# Patient Record
Sex: MALE | Race: WHITE | NOT HISPANIC OR LATINO | Employment: FULL TIME | URBAN - NONMETROPOLITAN AREA
[De-identification: names, ages, dates, MRNs, and addresses within clinical notes are randomized per-mention and may not be internally consistent; named-entity substitution may affect disease eponyms.]

---

## 2019-04-04 ENCOUNTER — OFFICE VISIT (OUTPATIENT)
Dept: ORTHOPEDIC SURGERY | Facility: CLINIC | Age: 29
End: 2019-04-04

## 2019-04-04 VITALS — HEIGHT: 70 IN | BODY MASS INDEX: 41.43 KG/M2 | TEMPERATURE: 98.2 F | WEIGHT: 289.4 LBS

## 2019-04-04 DIAGNOSIS — M25.562 PAIN IN BOTH KNEES, UNSPECIFIED CHRONICITY: Primary | ICD-10-CM

## 2019-04-04 DIAGNOSIS — M25.561 PAIN IN BOTH KNEES, UNSPECIFIED CHRONICITY: Primary | ICD-10-CM

## 2019-04-04 PROCEDURE — 20610 DRAIN/INJ JOINT/BURSA W/O US: CPT | Performed by: PHYSICIAN ASSISTANT

## 2019-04-04 PROCEDURE — 73562 X-RAY EXAM OF KNEE 3: CPT | Performed by: PHYSICIAN ASSISTANT

## 2019-04-04 PROCEDURE — 99203 OFFICE O/P NEW LOW 30 MIN: CPT | Performed by: PHYSICIAN ASSISTANT

## 2019-04-04 RX ADMIN — METHYLPREDNISOLONE ACETATE 160 MG: 80 INJECTION, SUSPENSION INTRA-ARTICULAR; INTRALESIONAL; INTRAMUSCULAR; SOFT TISSUE at 09:30

## 2019-04-04 RX ADMIN — LIDOCAINE HYDROCHLORIDE 2 ML: 10 INJECTION, SOLUTION EPIDURAL; INFILTRATION; INTRACAUDAL; PERINEURAL at 09:30

## 2019-04-04 NOTE — PROGRESS NOTES
NEW VISIT    Patient: Hank Mayorga  ?  YOB: 1990  ?  Chief Complaint   Patient presents with   • Right Knee - Pain   • Left Knee - Pain      ?  HPI: Patient presents today for bilateral knee pain and discomfort that has been on going for about 2 months.  He reports the pain began in the right knee first approximately 2 months ago.  He denies any specific injury and states that he woke up one day and just had difficulty walking.  After he began walking differently to compensate for his right knee pain the left knee began hurting as well.  The pain is located at the top and behind the patella.  Today he reports the right knee is actually not hurting at this time and the left knee is rated at 9/10.  He has taken 2 rounds of oral steroids which help for short time.  He also uses ibuprofen 800 mg twice daily and although it does not completely resolve the pain it does dull it.  He states his pain is intermittent and stabbing.  There is associated clicking/popping and his pain is made worse with standing.  He has experienced decrease in symptoms with rest and NSAIDs.  He has previously seen his PCP, Dr. Quach and had x-rays but did not bring them to the visit today.      This patient is a new patient.  This problem is new to this examiner.    Allergies: No Known Allergies    Medications:   Home Medications:  Current Outpatient Medications on File Prior to Visit   Medication Sig   • [DISCONTINUED] IBUPROFEN PO Take  by mouth.     No current facility-administered medications on file prior to visit.      Current Medications:  Scheduled Meds:  PRN Meds:.    I have reviewed the patient's medical history in detail and updated the computerized patient record.  Review and summarization of old records include:    History reviewed. No pertinent past medical history.  History reviewed. No pertinent surgical history.  Social History     Occupational History   • Not on file   Tobacco Use   • Smoking status: Never  "Smoker   • Smokeless tobacco: Never Used   Substance and Sexual Activity   • Alcohol use: No     Frequency: Never   • Drug use: Defer   • Sexual activity: Not on file      Social History     Social History Narrative   • Not on file     Family History   Problem Relation Age of Onset   • Cancer Other         SKIN   • Diabetes Other    • Heart disease Other    • Hypertension Other          Review of Systems   Constitutional: Negative.  Negative for fever.   Eyes: Negative.    Respiratory: Negative.    Cardiovascular: Negative.    Endocrine: Negative.    Musculoskeletal: Positive for arthralgias, gait problem and joint swelling.   Skin: Negative.  Negative for rash and wound.   Allergic/Immunologic: Negative.    Neurological: Negative for numbness.   Hematological: Negative.    Psychiatric/Behavioral: Negative.           Wt Readings from Last 3 Encounters:   04/04/19 131 kg (289 lb 6.4 oz)     Ht Readings from Last 3 Encounters:   04/04/19 177.8 cm (70\")     Body mass index is 41.52 kg/m².  Facility age limit for growth percentiles is 20 years.  Vitals:    04/04/19 0819   Temp: 98.2 °F (36.8 °C)         Physical Exam   Constitutional: Patient is oriented to person, place, and time. Appears well-developed and well-nourished.   HENT:   Head: Normocephalic and atraumatic.   Eyes: Conjunctivae and EOM are normal. Pupils are equal, round, and reactive to light.   Cardiovascular: Normal rate, regular rhythm, normal heart sounds and intact distal pulses.   Pulmonary/Chest: Effort normal and breath sounds normal.   Musculoskeletal:   See detailed exam below   Neurological: Alert and oriented to person, place, and time. No sensory deficit. Coordination normal.   Skin: Skin is warm and dry. Capillary refill takes less than 2 seconds. No rash noted. No erythema.   Psychiatric: Patient has a normal mood and affect. Her behavior is normal. Judgment and thought content normal.   Nursing note and vitals reviewed.    Ortho Exam: "   Bilateral knee (cmp). The patients' patellar grind test is exquisitely postive.  A lot of pain and discomfort in the retropatellar area. The patient has high Q-angle. Range of motion is from 0- 120 degrees of flexion. Anterior and posterior drawer signs are negative. No medial or lateral instability is noted. The patella tends to track laterally in high grades of flexion. A Slightly positive apprehension sign is noted. There is some tenderness over the medial patellofemoral ligaments. Skin and soft tissues are swollen; consistent with inflammatory changes of the patellofemoral joint. Dorsalis pedis and posterior tibial artery pulses are palpable. Common peroneal nerve function is well preserved. Quad mechanism is well preserved.        Diagnostics:  X-Ray Report:  Bilateral knee(s) X-Ray  Indication: Evaluation of bilateral knee pain  AP, Lateral views  Findings: Very mild degenerative changes, more pronounced on left knee at patellofemoral joint space  Bony lesion: no  Soft tissues: within normal limits  Joint spaces: decreased  Hardware appropriately positioned: not applicable    Prior studies available for comparison: no     X-RAY was ordered and reviewed by Carlos Liu PA-C       Assessment:  Hank was seen today for pain and pain.    Diagnoses and all orders for this visit:    Pain in both knees, unspecified chronicity  -     XR Knee 3 View Bilateral  -     Large Joint Arthrocentesis: L knee  -     meloxicam (MOBIC) 15 MG tablet; 1 PO Daily with food.          Large Joint Arthrocentesis: L knee  Date/Time: 4/4/2019 9:30 AM  Consent given by: patient  Site marked: site marked  Timeout: Immediately prior to procedure a time out was called to verify the correct patient, procedure, equipment, support staff and site/side marked as required   Supporting Documentation  Indications: pain   Procedure Details  Location: knee - L knee  Preparation: Patient was prepped and draped in the usual sterile  fashion  Needle size: 25 G  Approach: anteromedial  Medications administered: 2 mL lidocaine PF 1% 1 %; 160 mg methylPREDNISolone acetate 80 MG/ML  Patient tolerance: patient tolerated the procedure well with no immediate complications        ?    Plan    · Discussed treatment options including NSAIDs, intra-articular injections of steroid and Visco supplementation, bracing and physical therapy  · Risks and benefits of injection therapy discussed with patient.  Possibility of bruising, pain, swelling, infection, increased blood sugar levels and soft tissue atrophy discussed in detail.  Plan is to proceed with injection of steroid in the left knee.  · Injected patient's left knee joint(s)with steroid  from an anteromedial approach   · Hinged knee brace provided in office today  · Rest, ice, compression, and elevation (RICE) therapy  · Stretching and strengthening exercises  · New prescription: Mobic 15 mg/# 30/1 tablet by mouth daily.  Instructed not to take any other NSAIDs while on Mobic.  · Follow up in 3 month(s)  · Time spent with patient: 35 minutes face-to-face with greater than 50% spent in counseling and coordination of care as discussed above.      Carlos Liu PA-C  4/4/19

## 2019-04-08 RX ORDER — MELOXICAM 15 MG/1
TABLET ORAL
Qty: 30 TABLET | Refills: 2 | Status: SHIPPED | OUTPATIENT
Start: 2019-04-08

## 2019-04-08 RX ORDER — METHYLPREDNISOLONE ACETATE 80 MG/ML
160 INJECTION, SUSPENSION INTRA-ARTICULAR; INTRALESIONAL; INTRAMUSCULAR; SOFT TISSUE
Status: COMPLETED | OUTPATIENT
Start: 2019-04-04 | End: 2019-04-04

## 2019-04-08 RX ORDER — LIDOCAINE HYDROCHLORIDE 10 MG/ML
2 INJECTION, SOLUTION EPIDURAL; INFILTRATION; INTRACAUDAL; PERINEURAL
Status: COMPLETED | OUTPATIENT
Start: 2019-04-04 | End: 2019-04-04

## 2019-06-14 ENCOUNTER — OFFICE VISIT (OUTPATIENT)
Dept: ORTHOPEDIC SURGERY | Facility: CLINIC | Age: 29
End: 2019-06-14

## 2019-06-14 VITALS — HEIGHT: 70 IN | BODY MASS INDEX: 41.37 KG/M2 | WEIGHT: 289 LBS

## 2019-06-14 DIAGNOSIS — M25.562 CHRONIC PAIN OF BOTH KNEES: Primary | ICD-10-CM

## 2019-06-14 DIAGNOSIS — G89.29 CHRONIC PAIN OF BOTH KNEES: Primary | ICD-10-CM

## 2019-06-14 DIAGNOSIS — M25.561 CHRONIC PAIN OF BOTH KNEES: Primary | ICD-10-CM

## 2019-06-14 PROCEDURE — 20610 DRAIN/INJ JOINT/BURSA W/O US: CPT | Performed by: PHYSICIAN ASSISTANT

## 2019-06-14 RX ADMIN — METHYLPREDNISOLONE ACETATE 160 MG: 80 INJECTION, SUSPENSION INTRA-ARTICULAR; INTRALESIONAL; INTRAMUSCULAR; SOFT TISSUE at 08:55

## 2019-06-14 RX ADMIN — LIDOCAINE HYDROCHLORIDE 2 ML: 10 INJECTION, SOLUTION EPIDURAL; INFILTRATION; INTRACAUDAL; PERINEURAL at 08:55

## 2019-06-14 NOTE — PROGRESS NOTES
FOLLOW UP VISIT    Patient: Hank Mayorga  ?  YOB: 1990    MRN: [id number]  ?  Chief Complaint   Patient presents with   • Left Knee - Follow-up   • Right Knee - Follow-up      ?  HPI:   28 year old male patient presents today for a follow up on bilateral knee pain. At last visit, 2 months ago, he received a steroid injection into the left knee. He states it is still doing fairly well with minimal discomfort. He returns today for increased pain in the right knee. The pain is described as a stabbing pain that is intermittent. Today he would like to try a steroid injection into the right knee.     This patient is an established patient.  This problem is not new to this examiner.      Allergies: No Known Allergies    Medications:   Home Medications:  Current Outpatient Medications on File Prior to Visit   Medication Sig   • diclofenac (VOLTAREN) 50 MG EC tablet TAKE 1 TABLET BY MOUTH TWICE DAILY - TAKE WITH FOOD   • meloxicam (MOBIC) 15 MG tablet 1 PO Daily with food.     No current facility-administered medications on file prior to visit.      Current Medications:  Scheduled Meds:  PRN Meds:.    I have reviewed the patient's medical history in detail and updated the computerized patient record.  Review and summarization of old records include:    History reviewed. No pertinent past medical history.  No past surgical history on file.  Social History     Occupational History   • Not on file   Tobacco Use   • Smoking status: Never Smoker   • Smokeless tobacco: Never Used   Substance and Sexual Activity   • Alcohol use: No     Frequency: Never   • Drug use: Defer   • Sexual activity: Not on file      Social History     Social History Narrative   • Not on file     Family History   Problem Relation Age of Onset   • Cancer Other         SKIN   • Diabetes Other    • Heart disease Other    • Hypertension Other          Review of Systems       Wt Readings from Last 3 Encounters:   06/14/19 131 kg (289 lb)  "  04/04/19 131 kg (289 lb 6.4 oz)     Ht Readings from Last 3 Encounters:   06/14/19 177.8 cm (70\")   04/04/19 177.8 cm (70\")     Body mass index is 41.47 kg/m².  Facility age limit for growth percentiles is 20 years.  There were no vitals filed for this visit.      Physical Exam   Constitutional: Patient is oriented to person, place, and time. Appears well-developed and well-nourished.   HENT:   Head: Normocephalic and atraumatic.   Eyes: Conjunctivae and EOM are normal. Pupils are equal, round, and reactive to light.   Cardiovascular: Normal rate, regular rhythm, normal heart sounds and intact distal pulses.   Pulmonary/Chest: Effort normal and breath sounds normal.   Musculoskeletal:   See detailed exam below   Neurological: Alert and oriented to person, place, and time. No sensory deficit. Coordination normal.   Skin: Skin is warm and dry. Capillary refill takes less than 2 seconds. No rash noted. No erythema.   Psychiatric: Patient has a normal mood and affect. Her behavior is normal. Judgment and thought content normal.   Nursing note and vitals reviewed.    Ortho Exam:   Bilateral knee (cmp). The patients' patellar grind test is exquisitely postive.  A lot of pain and discomfort in the retropatellar area. The patient has high Q-angle. Range of motion is from 0-120 degrees of flexion. Anterior and posterior drawer signs are negative. No medial or lateral instability is noted. The patella tends to track laterally in high grades of flexion. A Slightly positive apprehension sign is noted. There is some tenderness over the medial patellofemoral ligaments. Skin and soft tissues are swollen; consistent with inflammatory changes of the patellofemoral joint. Dorsalis pedis and posterior tibial artery pulses are palpable. Common peroneal nerve function is well preserved. Quad mechanism is well preserved.          Diagnostics:       Assessment:  Hank was seen today for follow-up and follow-up.    Diagnoses and all " orders for this visit:    Chronic pain of both knees          Large Joint Arthrocentesis: R knee  Date/Time: 6/14/2019 8:55 AM  Consent given by: patient  Site marked: site marked  Timeout: Immediately prior to procedure a time out was called to verify the correct patient, procedure, equipment, support staff and site/side marked as required   Supporting Documentation  Indications: pain   Procedure Details  Location: knee - R knee  Preparation: Patient was prepped and draped in the usual sterile fashion  Needle size: 25 G  Approach: anteromedial  Medications administered: 2 mL lidocaine PF 1% 1 %; 160 mg methylPREDNISolone acetate 80 MG/ML  Patient tolerance: patient tolerated the procedure well with no immediate complications        ?    Plan    · Injected patient's right knee joint(s)with steroid  from a(n) anteromedial approach.  Patient tolerated the procedure well and no complications were encountered.  · Continue to use hinged knee brace as needed  · Rest, ice, compression, and elevation (RICE) therapy  · Stretching and strengthening exercises  · Continue with mobic or other anti-inflammatory   · Follow up in 3-6 month(s)    Carlos Liu PA-C  06/14/2019

## 2019-06-16 RX ORDER — METHYLPREDNISOLONE ACETATE 80 MG/ML
160 INJECTION, SUSPENSION INTRA-ARTICULAR; INTRALESIONAL; INTRAMUSCULAR; SOFT TISSUE
Status: COMPLETED | OUTPATIENT
Start: 2019-06-14 | End: 2019-06-14

## 2019-06-16 RX ORDER — LIDOCAINE HYDROCHLORIDE 10 MG/ML
2 INJECTION, SOLUTION EPIDURAL; INFILTRATION; INTRACAUDAL; PERINEURAL
Status: COMPLETED | OUTPATIENT
Start: 2019-06-14 | End: 2019-06-14

## 2020-09-16 ENCOUNTER — CLINICAL SUPPORT (OUTPATIENT)
Dept: ORTHOPEDIC SURGERY | Facility: CLINIC | Age: 30
End: 2020-09-16

## 2020-09-16 VITALS — BODY MASS INDEX: 40.09 KG/M2 | WEIGHT: 280 LBS | TEMPERATURE: 98.7 F | HEIGHT: 70 IN

## 2020-09-16 DIAGNOSIS — M25.562 CHRONIC PAIN OF BOTH KNEES: Primary | ICD-10-CM

## 2020-09-16 DIAGNOSIS — M25.561 CHRONIC PAIN OF BOTH KNEES: Primary | ICD-10-CM

## 2020-09-16 DIAGNOSIS — G89.29 CHRONIC PAIN OF BOTH KNEES: Primary | ICD-10-CM

## 2020-09-16 PROCEDURE — 20610 DRAIN/INJ JOINT/BURSA W/O US: CPT | Performed by: PHYSICIAN ASSISTANT

## 2020-09-16 RX ORDER — METHYLPREDNISOLONE ACETATE 80 MG/ML
160 INJECTION, SUSPENSION INTRA-ARTICULAR; INTRALESIONAL; INTRAMUSCULAR; SOFT TISSUE
Status: COMPLETED | OUTPATIENT
Start: 2020-09-16 | End: 2020-09-16

## 2020-09-16 RX ORDER — LIDOCAINE HYDROCHLORIDE 10 MG/ML
2 INJECTION, SOLUTION INFILTRATION; PERINEURAL
Status: COMPLETED | OUTPATIENT
Start: 2020-09-16 | End: 2020-09-16

## 2020-09-16 RX ADMIN — METHYLPREDNISOLONE ACETATE 160 MG: 80 INJECTION, SUSPENSION INTRA-ARTICULAR; INTRALESIONAL; INTRAMUSCULAR; SOFT TISSUE at 08:39

## 2020-09-16 RX ADMIN — LIDOCAINE HYDROCHLORIDE 2 ML: 10 INJECTION, SOLUTION INFILTRATION; PERINEURAL at 08:39

## 2020-09-16 NOTE — PROGRESS NOTES
Procedure   Large Joint Arthrocentesis: R knee  Date/Time: 9/16/2020 8:39 AM  Consent given by: patient  Site marked: site marked  Timeout: Immediately prior to procedure a time out was called to verify the correct patient, procedure, equipment, support staff and site/side marked as required   Supporting Documentation  Indications: pain   Procedure Details  Location: knee - R knee  Preparation: Patient was prepped and draped in the usual sterile fashion  Needle size: 25 G  Approach: anteromedial  Medications administered: 2 mL lidocaine 1 %; 160 mg methylPREDNISolone acetate 80 MG/ML  Patient tolerance: patient tolerated the procedure well with no immediate complications    Large Joint Arthrocentesis: L knee  Date/Time: 9/16/2020 8:39 AM  Consent given by: patient  Site marked: site marked  Timeout: Immediately prior to procedure a time out was called to verify the correct patient, procedure, equipment, support staff and site/side marked as required   Supporting Documentation  Indications: pain   Procedure Details  Location: knee - L knee  Preparation: Patient was prepped and draped in the usual sterile fashion  Needle size: 25 G  Approach: anteromedial  Medications administered: 2 mL lidocaine 1 %; 160 mg methylPREDNISolone acetate 80 MG/ML  Patient tolerance: patient tolerated the procedure well with no immediate complications      Electronically signed by Carlos Liu PA-C, 09/16/20, 9:13 AM EDT.

## 2020-09-16 NOTE — PROGRESS NOTES
INJECTION      Patient: Hank Mayorga    MRN: [id number]    YOB: 1990    Chief Complaint   Patient presents with   • Left Knee - Follow-up, Pain   • Right Knee - Follow-up, Pain         History of Present Illness:  Hank Mayorga is here today for injection therapy. He receives  BILATERAL knee injections of steroid. Since last injection, patient notes substantial relief of symptoms.  It has been greater than 1 year since last set of injections but he has been trying to hold off to limit the exposure of steroids due to his young age.  Treatment options have been discussed and he understands and consents.       Physical Exam:   29 y.o. male awake, alert, oriented, in no acute distress and well developed, well nourished.  BILATERAL knee  · Positive patellar grind test with pain in the retropatellar region.    · Positive for high Q-angle with patella tracking laterally in high grades of flexion.   · Skin and soft tissues are swollen, consistent with inflammatory changes of the patellofemoral joint.    · Positive for tenderness over the medial patellofemoral ligaments.   · Quadriceps mechanism is well preserved.   · Range of motion-Extension to Flexion: 0-120 degrees.  · Negative apprehension sign.  · Negative anterior and posterior drawer. No medial or lateral instability noted.   · Dorsalis pedis and posterior tibial artery pulses are palpable. Common peroneal nerve function is well preserved.        Procedures  See separate procedure note  Injection site was identified by physical examination and cleaned with Betadine and alcohol swabs. Prior to needle insertion, ethyl chloride spray was used for surface anesthesia. Sterile technique was used.       ASSESSMENT:  Hank was seen today for follow-up, pain, follow-up and pain.    Diagnoses and all orders for this visit:    Chronic pain of both knees  -     Large Joint Arthrocentesis: R knee  -     Large Joint Arthrocentesis: L knee          PLAN:    • Bilateral knee steroid injection was discussed with the patient. Discussed indication, risks, benefits, and alternatives. Verbal consent was given to proceed with the procedure.   • Injection was performed from anteromedial approach.  Patient tolerated the procedure well and no complications were encountered.  • Discussion of orthopedic goals and activities and patient/guardian expressed understanding.  • Ice, heat, rest, compression and elevation of extremity as beneficial  • nsaids and/or tylenol as beneficial  • Instructed to refrain from heavy activity/rest the extremity for the next 24-48 hours  • Discussion regarding possibility of cortisol flare and what to expect if this occurs  • Watch for signs and symptoms of infection  • Call if adverse effect from injection therapy  • Follow up PRN      Carlos Liu PA-C  Encounter Date: 9/16/2020    Electronically signed by Carlos Liu PA-C, 09/16/20, 9:13 AM EDT.      EMR Dragon/Transcription disclaimer:  Much of this encounter note is an electronic transcription/translation of spoken language to printed text. The electronic translation of spoken language may permit erroneous, or at times, nonsensical words or phrases to be inadvertently transcribed; Although I have reviewed the note for such errors, some may still exist.

## 2023-01-03 ENCOUNTER — HOSPITAL ENCOUNTER (OUTPATIENT)
Dept: GENERAL RADIOLOGY | Facility: HOSPITAL | Age: 33
Discharge: HOME | End: 2023-01-03
Admitting: PHYSICIAN ASSISTANT
Payer: COMMERCIAL

## 2023-01-03 ENCOUNTER — OFFICE VISIT (OUTPATIENT)
Dept: ORTHOPEDIC SURGERY | Facility: CLINIC | Age: 33
End: 2023-01-03
Payer: COMMERCIAL

## 2023-01-03 VITALS — HEIGHT: 70 IN | BODY MASS INDEX: 40.09 KG/M2 | WEIGHT: 280 LBS

## 2023-01-03 DIAGNOSIS — M25.561 CHRONIC PAIN OF BOTH KNEES: ICD-10-CM

## 2023-01-03 DIAGNOSIS — G89.29 CHRONIC PAIN OF BOTH KNEES: Primary | ICD-10-CM

## 2023-01-03 DIAGNOSIS — G89.29 CHRONIC PAIN OF BOTH KNEES: ICD-10-CM

## 2023-01-03 DIAGNOSIS — M25.562 CHRONIC PAIN OF BOTH KNEES: ICD-10-CM

## 2023-01-03 DIAGNOSIS — M17.10 PATELLOFEMORAL ARTHRITIS: ICD-10-CM

## 2023-01-03 DIAGNOSIS — M25.562 CHRONIC PAIN OF BOTH KNEES: Primary | ICD-10-CM

## 2023-01-03 DIAGNOSIS — M25.561 CHRONIC PAIN OF BOTH KNEES: Primary | ICD-10-CM

## 2023-01-03 PROCEDURE — 73562 X-RAY EXAM OF KNEE 3: CPT

## 2023-01-03 PROCEDURE — 20610 DRAIN/INJ JOINT/BURSA W/O US: CPT | Performed by: PHYSICIAN ASSISTANT

## 2023-01-03 PROCEDURE — 99214 OFFICE O/P EST MOD 30 MIN: CPT | Performed by: PHYSICIAN ASSISTANT

## 2023-01-03 RX ORDER — METHYLPREDNISOLONE ACETATE 80 MG/ML
160 INJECTION, SUSPENSION INTRA-ARTICULAR; INTRALESIONAL; INTRAMUSCULAR; SOFT TISSUE
Status: COMPLETED | OUTPATIENT
Start: 2023-01-03 | End: 2023-01-03

## 2023-01-03 RX ORDER — LIDOCAINE HYDROCHLORIDE 20 MG/ML
2 INJECTION, SOLUTION EPIDURAL; INFILTRATION; INTRACAUDAL; PERINEURAL
Status: COMPLETED | OUTPATIENT
Start: 2023-01-03 | End: 2023-01-03

## 2023-01-03 RX ADMIN — METHYLPREDNISOLONE ACETATE 160 MG: 80 INJECTION, SUSPENSION INTRA-ARTICULAR; INTRALESIONAL; INTRAMUSCULAR; SOFT TISSUE at 11:52

## 2023-01-03 RX ADMIN — LIDOCAINE HYDROCHLORIDE 2 ML: 20 INJECTION, SOLUTION EPIDURAL; INFILTRATION; INTRACAUDAL; PERINEURAL at 11:52

## 2023-01-03 NOTE — PROGRESS NOTES
Chief Complaint  Follow-up of the Left Knee and Follow-up of the Right Knee    Subjective    History of Present Illness      Hank Mayorga is a 32 y.o. male who presents to [facility] for follow up on  Knee Pain:   Patient complains of bilateral knee pain.  I last saw him for this complaint in September 2020 and administered bilateral knee injections.  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  Previously most of his pain, as well as now, is in the patellar region.  He states last set of injections lasted a good 6+ months.  He is experiencing pain with daily activities such as climbing, kneeling, walking.             Objective   Vital Signs:   Ht 177.8 cm (70\")   Wt 127 kg (280 lb)   BMI 40.18 kg/m²     Physical Exam  Vitals and nursing note reviewed.   Constitutional:       Appearance: Normal appearance.   Pulmonary:      Effort: Pulmonary effort is normal.   Skin:     General: Skin is warm and dry.      Capillary Refill: Capillary refill takes less than 2 seconds.   Neurological:      General: No focal deficit present.      Mental Status: He is alert and oriented to person, place, and time. Mental status is at baseline.   Psychiatric:         Mood and Affect: Mood normal.         Behavior: Behavior normal.         Thought Content: Thought content normal.         Judgment: Judgment normal.           Ortho Exam   BILATERAL knee  Positive patellar grind test with pain in the retropatellar region.    Skin and soft tissues consistent with inflammatory changes of the patellofemoral joint.    Positive for tenderness over the medial patellofemoral ligaments.   Quadriceps mechanism is well preserved.   Range of motion-Extension to Flexion: 0-120 degrees.  Negative anterior and posterior drawer. No medial or lateral instability noted.   Dorsalis pedis and posterior tibial artery  pulses are palpable.   Common peroneal nerve function is well preserved.      Result Review :   Radiologic studies - see below for interpretation  BILATERAL knee xrays  weightbearing/standing 3 views each were ordered by Carlos Liu PA-C. Performed at [facility] on 1/3/2023. Images were independently viewed and interpreted by myself, my impression as follows:  Findings: Mild joint space narrowing of the medial compartment bilaterally, mild degenerative changes of the patellofemoral compartment.  Lateral compartment fairly preserved.  Bony lesion: no  Soft tissues: within normal limits  Joint spaces: See above  Hardware appropriately positioned: not applicable  Prior studies available for comparison: yes           PROCEDURE  Large Joint Arthrocentesis: R knee  Date/Time: 1/3/2023 11:52 AM  Consent given by: patient  Site marked: site marked  Timeout: Immediately prior to procedure a time out was called to verify the correct patient, procedure, equipment, support staff and site/side marked as required   Supporting Documentation  Indications: pain   Procedure Details  Location: knee - R knee  Preparation: Patient was prepped and draped in the usual sterile fashion  Needle size: 25 G  Approach: anteromedial  Medications administered: 160 mg methylPREDNISolone acetate 80 MG/ML; 2 mL lidocaine PF 2% 2 %  Patient tolerance: patient tolerated the procedure well with no immediate complications    Large Joint Arthrocentesis: L knee  Date/Time: 1/3/2023 11:52 AM  Consent given by: patient  Site marked: site marked  Timeout: Immediately prior to procedure a time out was called to verify the correct patient, procedure, equipment, support staff and site/side marked as required   Supporting Documentation  Indications: pain   Procedure Details  Location: knee - L knee  Preparation: Patient was prepped and draped in the usual sterile fashion  Needle size: 25 G  Approach: anteromedial  Medications  administered: 160 mg methylPREDNISolone acetate 80 MG/ML; 2 mL lidocaine PF 2% 2 %  Patient tolerance: patient tolerated the procedure well with no immediate complications                 Assessment   Assessment and Plan    Diagnoses and all orders for this visit:    1. Chronic pain of both knees (Primary)  -     Large Joint Arthrocentesis: R knee  -     Large Joint Arthrocentesis: L knee    2. Patellofemoral arthritis  -     Large Joint Arthrocentesis: R knee  -     Large Joint Arthrocentesis: L knee         Follow Up   · Discussion of any imaging in detail. Discussion of orthopaedic goals.  · Risk, benefits, and merits of treatment alternatives reviewed with the patient. Treatment alternatives include: oral anti-inflammatories/NSAID, intra-articular steroid injection, intra-articular visco supplementation and further imaging/testing  · Ice, heat, and/or modalities as beneficial  · Risks of minor surgical procedure/intra-articular injection, including but not limited to pain, bleeding, infection into the joint, pigment skin changes related to steroid administration, increased blood sugar levels secondary to steroid administration, scar, recurrence of pain, and tendon rupture associated with steroid administration and possible need for further surgery reviewed with patient.  He accepts these risks and wishes to proceed with procedure. Injected patient's bilateral knee joint(s) with Depo-Medrol from a(n) anteromedial approach.  Patient tolerated the procedure well and no complications were encountered.  · Watch for signs and symptoms of infection  · Call or notify for any adverse effect from injection therapy  · Patient is encouraged to call or return for any issues or concerns.  · No brace was given at today's visit.  · Follow up as needed  • Patient was given instructions and counseling regarding his condition or for health maintenance advice. Please see specific information pulled into the AVS if appropriate.      Carlos Liu PA-C   Date of Encounter: 1/3/2023   Electronically signed by Carlos Liu PA-C, 01/03/23, 8:23 AM EST.   Electronically signed by Carlos Liu PA-C, 01/03/23, 12:11 PM EST.

## 2023-08-22 ENCOUNTER — OFFICE VISIT (OUTPATIENT)
Dept: FAMILY MEDICINE CLINIC | Facility: CLINIC | Age: 33
End: 2023-08-22
Payer: COMMERCIAL

## 2023-08-22 VITALS
HEIGHT: 70 IN | SYSTOLIC BLOOD PRESSURE: 118 MMHG | OXYGEN SATURATION: 96 % | WEIGHT: 289 LBS | BODY MASS INDEX: 41.37 KG/M2 | HEART RATE: 80 BPM | TEMPERATURE: 97.3 F | DIASTOLIC BLOOD PRESSURE: 72 MMHG

## 2023-08-22 DIAGNOSIS — R42 DIZZINESS: Primary | ICD-10-CM

## 2023-08-22 DIAGNOSIS — Z11.59 NEED FOR HEPATITIS C SCREENING TEST: ICD-10-CM

## 2023-08-22 DIAGNOSIS — Z13.89 SCREENING FOR HEMATURIA OR PROTEINURIA: ICD-10-CM

## 2023-08-22 DIAGNOSIS — R06.83 SNORING: ICD-10-CM

## 2023-08-22 DIAGNOSIS — Z13.220 SCREENING FOR HYPERLIPIDEMIA: ICD-10-CM

## 2023-08-22 DIAGNOSIS — R53.83 OTHER FATIGUE: ICD-10-CM

## 2023-08-22 DIAGNOSIS — Z13.0 SCREENING FOR DEFICIENCY ANEMIA: ICD-10-CM

## 2023-08-22 PROCEDURE — 99204 OFFICE O/P NEW MOD 45 MIN: CPT | Performed by: NURSE PRACTITIONER

## 2023-08-22 NOTE — PROGRESS NOTES
"Chief Complaint  Establish Care and Annual Exam    Subjective        Hank Mayorga presents to [facility]  History of Present Illness  This is a 32-year-old male patient here today to establish care.  He does not report any major health problems other than being in the shower the other day and feeling like he was going to pass out.  He states he did catch himself and did rested.  He stated fine after he rested and stayed home that day.  He does have a family history of coronary artery disease where his dad has had multiple heart attacks starting at the age of 40.  He does have arthritis in his knees and has had cortisone injections in both knees in the past.  He has had a colonoscopy 13 years ago where he was diagnosed with H. pylori.  He does report report fatigue and snoring at night.  His blood pressure is stable.  He denies any chest pain or shortness of breath lightheaded or dizziness today.        Objective   Vital Signs:  /72   Pulse 80   Temp 97.3 øF (36.3 øC)   Ht 177.8 cm (70\")   Wt 131 kg (289 lb)   SpO2 96%   BMI 41.47 kg/mý   Estimated body mass index is 41.47 kg/mý as calculated from the following:    Height as of this encounter: 177.8 cm (70\").    Weight as of this encounter: 131 kg (289 lb).       Class 3 Severe Obesity (BMI >=40). Obesity-related health conditions include the following: osteoarthritis. Obesity is newly identified. BMI is is above average; BMI management plan is completed. We discussed portion control and increasing exercise.      Physical Exam  Vitals and nursing note reviewed.   HENT:      Head: Normocephalic.      Nose: Nose normal.   Eyes:      Pupils: Pupils are equal, round, and reactive to light.   Cardiovascular:      Rate and Rhythm: Normal rate and regular rhythm.      Pulses: Normal pulses.      Heart sounds: Normal heart sounds.   Pulmonary:      Effort: Pulmonary effort is normal. No respiratory distress.      Breath sounds: " Normal breath sounds. No wheezing or rales.   Abdominal:      General: Bowel sounds are normal. There is no distension.      Tenderness: There is no abdominal tenderness.   Musculoskeletal:         General: No swelling.      Cervical back: Neck supple.      Right lower leg: No edema.      Left lower leg: No edema.   Skin:     General: Skin is warm and dry.   Neurological:      Mental Status: He is alert and oriented to person, place, and time.   Psychiatric:         Mood and Affect: Mood normal.      Result Review :                   Assessment and Plan   Diagnoses and all orders for this visit:    1. Dizziness (Primary)  -     TSH  -     T4, Free  -     T3    2. Screening for deficiency anemia  -     CBC & Differential    3. Screening for hyperlipidemia  -     Comprehensive Metabolic Panel  -     Lipid Panel    4. Snoring  -     Ambulatory Referral to Sleep Medicine    5. Other fatigue  -     TSH  -     T4, Free  -     T3  -     Ambulatory Referral to Sleep Medicine    6. Screening for hematuria or proteinuria  -     Urinalysis With Culture If Indicated - Urine, Clean Catch    7. Need for hepatitis C screening test  -     Hepatitis C Antibody    Patient has not had any more episodes of dizziness.  I will obtain lab work today.  We did discuss sleep medicine and I will put in a referral for that today.  He will monitor for any further symptoms and let me know for further work-up       Follow Up   No follow-ups on file.  Patient was given instructions and counseling regarding his condition or for health maintenance advice. Please see specific information pulled into the AVS if appropriate.

## 2023-08-26 LAB
ALBUMIN SERPL-MCNC: 4.4 G/DL (ref 4.1–5.1)
ALBUMIN/GLOB SERPL: 1.5 {RATIO} (ref 1.2–2.2)
ALP SERPL-CCNC: 77 IU/L (ref 44–121)
ALT SERPL-CCNC: 43 IU/L (ref 0–44)
APPEARANCE UR: CLEAR
AST SERPL-CCNC: 35 IU/L (ref 0–40)
BACTERIA #/AREA URNS HPF: NORMAL /[HPF]
BASOPHILS # BLD AUTO: 0 X10E3/UL (ref 0–0.2)
BASOPHILS NFR BLD AUTO: 0 %
BILIRUB SERPL-MCNC: 0.6 MG/DL (ref 0–1.2)
BILIRUB UR QL STRIP: NEGATIVE
BUN SERPL-MCNC: 9 MG/DL (ref 6–20)
BUN/CREAT SERPL: 10 (ref 9–20)
CALCIUM SERPL-MCNC: 9.8 MG/DL (ref 8.7–10.2)
CASTS URNS QL MICRO: NORMAL /LPF
CHLORIDE SERPL-SCNC: 103 MMOL/L (ref 96–106)
CHOLEST SERPL-MCNC: 184 MG/DL (ref 100–199)
CO2 SERPL-SCNC: 21 MMOL/L (ref 20–29)
COLOR UR: YELLOW
CREAT SERPL-MCNC: 0.93 MG/DL (ref 0.76–1.27)
EGFRCR SERPLBLD CKD-EPI 2021: 111 ML/MIN/1.73
EOSINOPHIL # BLD AUTO: 0.1 X10E3/UL (ref 0–0.4)
EOSINOPHIL NFR BLD AUTO: 1 %
EPI CELLS #/AREA URNS HPF: NORMAL /HPF (ref 0–10)
ERYTHROCYTE [DISTWIDTH] IN BLOOD BY AUTOMATED COUNT: 13.7 % (ref 11.6–15.4)
GLOBULIN SER CALC-MCNC: 3 G/DL (ref 1.5–4.5)
GLUCOSE SERPL-MCNC: 83 MG/DL (ref 70–99)
GLUCOSE UR QL STRIP: NEGATIVE
HCT VFR BLD AUTO: 46.9 % (ref 37.5–51)
HCV IGG SERPL QL IA: NON REACTIVE
HDLC SERPL-MCNC: 25 MG/DL
HGB BLD-MCNC: 15.1 G/DL (ref 13–17.7)
HGB UR QL STRIP: NEGATIVE
IMM GRANULOCYTES # BLD AUTO: 0 X10E3/UL (ref 0–0.1)
IMM GRANULOCYTES NFR BLD AUTO: 0 %
KETONES UR QL STRIP: ABNORMAL
LDLC SERPL CALC-MCNC: 71 MG/DL (ref 0–99)
LEUKOCYTE ESTERASE UR QL STRIP: NEGATIVE
LYMPHOCYTES # BLD AUTO: 2.5 X10E3/UL (ref 0.7–3.1)
LYMPHOCYTES NFR BLD AUTO: 27 %
MCH RBC QN AUTO: 29.4 PG (ref 26.6–33)
MCHC RBC AUTO-ENTMCNC: 32.2 G/DL (ref 31.5–35.7)
MCV RBC AUTO: 91 FL (ref 79–97)
MICRO URNS: ABNORMAL
MICRO URNS: ABNORMAL
MONOCYTES # BLD AUTO: 0.6 X10E3/UL (ref 0.1–0.9)
MONOCYTES NFR BLD AUTO: 7 %
NEUTROPHILS # BLD AUTO: 6 X10E3/UL (ref 1.4–7)
NEUTROPHILS NFR BLD AUTO: 65 %
NITRITE UR QL STRIP: NEGATIVE
PH UR STRIP: 5.5 [PH] (ref 5–7.5)
PLATELET # BLD AUTO: 222 X10E3/UL (ref 150–450)
POTASSIUM SERPL-SCNC: 3.9 MMOL/L (ref 3.5–5.2)
PROT SERPL-MCNC: 7.4 G/DL (ref 6–8.5)
PROT UR QL STRIP: NEGATIVE
RBC # BLD AUTO: 5.14 X10E6/UL (ref 4.14–5.8)
RBC #/AREA URNS HPF: NORMAL /HPF (ref 0–2)
SODIUM SERPL-SCNC: 144 MMOL/L (ref 134–144)
SP GR UR STRIP: 1.02 (ref 1–1.03)
T3 SERPL-MCNC: 139 NG/DL (ref 71–180)
T4 FREE SERPL-MCNC: 1.15 NG/DL (ref 0.82–1.77)
TRIGL SERPL-MCNC: 566 MG/DL (ref 0–149)
TSH SERPL DL<=0.005 MIU/L-ACNC: 1.76 UIU/ML (ref 0.45–4.5)
URINALYSIS REFLEX: ABNORMAL
UROBILINOGEN UR STRIP-MCNC: 0.2 MG/DL (ref 0.2–1)
VLDLC SERPL CALC-MCNC: 88 MG/DL (ref 5–40)
WBC # BLD AUTO: 9.3 X10E3/UL (ref 3.4–10.8)
WBC #/AREA URNS HPF: NORMAL /HPF (ref 0–5)

## 2023-09-19 RX ORDER — ICOSAPENT ETHYL 1000 MG/1
2 CAPSULE ORAL 2 TIMES DAILY WITH MEALS
Qty: 120 CAPSULE | Refills: 3 | Status: SHIPPED | OUTPATIENT
Start: 2023-09-19 | End: 2023-09-19 | Stop reason: SDUPTHER

## 2023-09-19 RX ORDER — ICOSAPENT ETHYL 1000 MG/1
2 CAPSULE ORAL 2 TIMES DAILY WITH MEALS
Qty: 120 CAPSULE | Refills: 3 | Status: SHIPPED | OUTPATIENT
Start: 2023-09-19

## 2023-09-19 NOTE — PROGRESS NOTES
Triglycerides are elevated and recommend starting a medication called Vascepa.  I have sent that over to his pharmacy.  I would like for him to follow-up in 3 months for repeat lipid panel.  Also limit sugary foods, drinks and alcohol.

## 2023-09-27 RX ORDER — OMEGA-3-ACID ETHYL ESTERS 1 G/1
2 CAPSULE, LIQUID FILLED ORAL 2 TIMES DAILY
Qty: 60 CAPSULE | Refills: 5 | Status: SHIPPED | OUTPATIENT
Start: 2023-09-27 | End: 2023-09-29 | Stop reason: SDUPTHER

## 2023-09-29 ENCOUNTER — PRIOR AUTHORIZATION (OUTPATIENT)
Dept: FAMILY MEDICINE CLINIC | Facility: CLINIC | Age: 33
End: 2023-09-29
Payer: COMMERCIAL

## 2023-09-29 RX ORDER — OMEGA-3-ACID ETHYL ESTERS 1 G/1
2 CAPSULE, LIQUID FILLED ORAL 2 TIMES DAILY
Qty: 60 CAPSULE | Refills: 5 | Status: SHIPPED | OUTPATIENT
Start: 2023-09-29

## 2023-09-29 NOTE — TELEPHONE ENCOUNTER
Patients prior authorization for Omega-3-acid Ethyl Esters 1GM Capsules started on 09/29/23 and approved 09/29/23.     I called patient to advise him and he stated that he needs the medication to go to [facility] in [place] instead of [facility] in [place].

## 2023-11-03 ENCOUNTER — OFFICE VISIT (OUTPATIENT)
Dept: SLEEP MEDICINE | Facility: HOSPITAL | Age: 33
End: 2023-11-03
Payer: COMMERCIAL

## 2023-11-03 VITALS
OXYGEN SATURATION: 97 % | HEART RATE: 52 BPM | DIASTOLIC BLOOD PRESSURE: 66 MMHG | SYSTOLIC BLOOD PRESSURE: 118 MMHG | HEIGHT: 70 IN | WEIGHT: 300 LBS | BODY MASS INDEX: 42.95 KG/M2

## 2023-11-03 DIAGNOSIS — G47.10 HYPERSOMNIA: Primary | ICD-10-CM

## 2023-11-03 DIAGNOSIS — E66.01 MORBID OBESITY WITH BODY MASS INDEX OF 40.0-49.9: ICD-10-CM

## 2023-11-03 DIAGNOSIS — R06.83 SNORING: ICD-10-CM

## 2023-11-03 DIAGNOSIS — R06.81 WITNESSED EPISODE OF APNEA: ICD-10-CM

## 2023-11-03 DIAGNOSIS — R53.83 OTHER FATIGUE: ICD-10-CM

## 2023-11-03 PROCEDURE — G0463 HOSPITAL OUTPT CLINIC VISIT: HCPCS

## 2023-11-03 NOTE — PROGRESS NOTES
"[facility]  Telephone: [phone] / Fax: [phone] [place]  Telephone: [phone] / Fax: [phone] Nalini Sorenson    Referring Physician: Marycarmen Rodriguez APRN  PCP: Marycarmen Rodriguez APRN    Reason for consult:  sleep apnea    Hank Mayorga is a 33 y.o.male  was seen in the Sleep Disorders Center today for evaluation of sleep apnea. He reports loud snoring observed by the wife for several years now. In the past it occurred in supine position only, now it occurs in all positions and is loud. He wakes up choking/gasping for breath all the time. He denies morning headaches. He reports GERD due to foods that he eats. He goes to bed at 8-9pm and is up at 3am for work. His schedule fluctuates. He may work 18 hr a day. On weekends he increases sleep time, he feels better, but never feels 100%, unless he is on vacation. ESS is 15/24, c/w EDS. In past 5 years he gained 50 lb, which made the snoring worse. His sleep is very restless. He DNS and may need further work up for it.    SH- , drinks caffeine in am, drinks alcohol occasionally.    ROS-+anxiety, +depression, +arthritis.    Hank Mayorga  has a past medical history of Knee swelling (3 yrs ago).    Current Medications:    Current Outpatient Medications:     omega-3 acid ethyl esters (Lovaza) 1 g capsule, Take 2 capsules by mouth 2 (Two) Times a Day., Disp: 60 capsule, Rfl: 5    I have reviewed Past Medical History, Past Surgical History, Medication List, Social History and Family History as entered in Sleep Questionnaire and EPIC.    ESS  15   Vital Signs /66   Pulse 52   Ht 177.8 cm (70\")   Wt 136 kg (300 lb)   SpO2 97%   BMI 43.05 kg/m²  Body mass index is 43.05 kg/m².    General Alert and oriented. No acute distress noted   Pharynx/Throat Class IV  Mallampati airway, large tongue, no evidence of redundant lateral pharyngeal tissue. No oral lesions. No thrush. Moist mucous membranes.   Head " Normocephalic. Symmetrical. Atraumatic.    Nose No septal deviation. No drainage   Chest Wall Normal shape. Symmetric expansion with respiration. No tenderness.   Neck Trachea midline, no thyromegaly or adenopathy    Lungs Clear to auscultation bilaterally. No wheezes. No rhonchi. No rales. Respirations regular, even and unlabored.   Heart Regular rhythm and normal rate. Normal S1 and S2. No murmur   Abdomen Soft, non-tender and non-distended. Normal bowel sounds. No masses.   Extremities Moves all extremities well. No edema   Psychiatric Normal mood and affect.        Impression:  1. Snoring    2. Other fatigue    3. Hypersomnia    4. Witnessed episode of apnea    5. Morbid obesity with body mass index of 40.0-49.9          Plan:  I discussed the pathophysiology of obstructive sleep apnea with the patient.  We discussed the adverse outcomes associated with untreated sleep-disordered breathing.  We discussed treatment modalities of obstructive sleep apnea including CPAP device. Sleep study will be scheduled to establish a definitive diagnosis of sleep disorder breathing.  Weight loss will be strongly beneficial in order to reduce the severity of sleep-disordered breathing.  Patient has narrow oropharyngeal structure.  Caution during activities that require prolonged concentration is strongly advised.  After sleep study results are available, patient will be notified, and appointment will be scheduled to discuss sleep study results and treatment recommendations.    HST was scheduled.    I appreciate the opportunity to participate in this patient's care.      KIERRA Leroy  [facility]  Phone: [phone]      Part of this note may be an electronic transcription/translation of spoken language to printed text using the Dragon Dictation System. Some errors may exist even though the document was edited.

## 2023-12-06 ENCOUNTER — OFFICE VISIT (OUTPATIENT)
Dept: ORTHOPEDIC SURGERY | Facility: CLINIC | Age: 33
End: 2023-12-06
Payer: COMMERCIAL

## 2023-12-06 VITALS — HEIGHT: 70 IN | BODY MASS INDEX: 42.23 KG/M2 | TEMPERATURE: 97.8 F | WEIGHT: 295 LBS

## 2023-12-06 DIAGNOSIS — M25.562 CHRONIC PAIN OF BOTH KNEES: ICD-10-CM

## 2023-12-06 DIAGNOSIS — M25.561 CHRONIC PAIN OF BOTH KNEES: ICD-10-CM

## 2023-12-06 DIAGNOSIS — M17.10 PATELLOFEMORAL ARTHRITIS: Primary | ICD-10-CM

## 2023-12-06 DIAGNOSIS — G89.29 CHRONIC PAIN OF BOTH KNEES: ICD-10-CM

## 2023-12-06 RX ORDER — METHYLPREDNISOLONE ACETATE 80 MG/ML
160 INJECTION, SUSPENSION INTRA-ARTICULAR; INTRALESIONAL; INTRAMUSCULAR; SOFT TISSUE
Status: COMPLETED | OUTPATIENT
Start: 2023-12-06 | End: 2023-12-06

## 2023-12-06 RX ORDER — LIDOCAINE HYDROCHLORIDE 20 MG/ML
20 INJECTION, SOLUTION EPIDURAL; INFILTRATION; INTRACAUDAL; PERINEURAL
Status: COMPLETED | OUTPATIENT
Start: 2023-12-06 | End: 2023-12-06

## 2023-12-06 RX ADMIN — LIDOCAINE HYDROCHLORIDE 20 ML: 20 INJECTION, SOLUTION EPIDURAL; INFILTRATION; INTRACAUDAL; PERINEURAL at 13:51

## 2023-12-06 RX ADMIN — METHYLPREDNISOLONE ACETATE 160 MG: 80 INJECTION, SUSPENSION INTRA-ARTICULAR; INTRALESIONAL; INTRAMUSCULAR; SOFT TISSUE at 13:51

## 2023-12-06 NOTE — PROGRESS NOTES
"Chief Complaint  Follow-up of the Left Knee (INJECTION) and Follow-up of the Right Knee (INJECTION)    Subjective    History of Present Illness      Hank Mayorga is a 33 y.o. male who presents to [facility] for injection therapy of both knees. His last injections were in January 2023 and he reports the pain returned in August or September. He states he has also been working double what he normally would.   Previously most of his pain, as well as now, is in the patellar region.  He is experiencing pain with daily activities such as climbing, kneeling, walking.             Objective   Vital Signs:   Temp 97.8 °F (36.6 °C)   Ht 177.8 cm (70\")   Wt 134 kg (295 lb)   BMI 42.33 kg/m²     Physical Exam  Vitals and nursing note reviewed.   Constitutional:       Appearance: Normal appearance.   Pulmonary:      Effort: Pulmonary effort is normal.   Skin:     General: Skin is warm and dry.      Capillary Refill: Capillary refill takes less than 2 seconds.   Neurological:      General: No focal deficit present.      Mental Status: He is alert and oriented to person, place, and time. Mental status is at baseline.   Psychiatric:         Mood and Affect: Mood normal.         Behavior: Behavior normal.         Thought Content: Thought content normal.         Judgment: Judgment normal.           Ortho Exam   BILATERAL knee  Positive patellar grind test with pain in the retropatellar region.    Skin and soft tissues consistent with inflammatory changes of the patellofemoral joint.    Positive for tenderness over the medial patellofemoral ligaments.   Quadriceps mechanism is well preserved.   Range of motion-Extension to Flexion: 0-120 degrees.  Negative anterior and posterior drawer. No medial or lateral instability noted.   Dorsalis pedis and posterior tibial artery pulses are palpable.   Common peroneal nerve function is well preserved.            PROCEDURE  - Large Joint Arthrocentesis: bilateral " knee on 12/6/2023 1:51 PM  Indications: pain  Details: 25 G needle, anteromedial approach  Medications (Right): 20 mL lidocaine PF 2% 2 %; 160 mg methylPREDNISolone acetate 80 MG/ML  Medications (Left): 20 mL lidocaine PF 2% 2 %; 160 mg methylPREDNISolone acetate 80 MG/ML  Outcome: tolerated well, no immediate complications  Procedure, treatment alternatives, risks and benefits explained, specific risks discussed. Consent was given by the patient. Immediately prior to procedure a time out was called to verify the correct patient, procedure, equipment, support staff and site/side marked as required. Patient was prepped and draped in the usual sterile fashion.                  Assessment   Assessment and Plan    Diagnoses and all orders for this visit:    1. Patellofemoral arthritis (Primary)  -     - Large Joint Arthrocentesis: bilateral knee    2. Chronic pain of both knees  -     - Large Joint Arthrocentesis: bilateral knee           Follow Up   Discussed possibly trying visco injections in the future, if insurance will approve.   Ice, heat, and/or modalities as beneficial  Risks of minor surgical procedure/intra-articular injection, including but not limited to pain, bleeding, infection into the joint, pigment skin changes related to steroid administration, increased blood sugar levels secondary to steroid administration, scar, recurrence of pain, and tendon rupture associated with steroid administration and possible need for further surgery reviewed with patient.  He accepts these risks and wishes to proceed with procedure.   Injected patient's bilateral knee joint(s) with Depo-Medrol from a(n) anteromedial approach.  Patient tolerated the procedure well and no complications were encountered.  Watch for signs and symptoms of infection  Call or notify for any adverse effect from injection therapy  Patient is encouraged to call or return for any issues or concerns.  Follow up as needed  Patient was given  instructions and counseling regarding his condition or for health maintenance advice. Please see specific information pulled into the AVS if appropriate.     Carlos Liu PA-C   Date of Encounter: 12/6/2023   Electronically signed by Carlos Liu PA-C, 12/06/23, 2:15 PM EST.    Xray Chest 1 View-PORTABLE IMMEDIATE

## 2023-12-19 ENCOUNTER — OFFICE VISIT (OUTPATIENT)
Dept: FAMILY MEDICINE CLINIC | Facility: CLINIC | Age: 33
End: 2023-12-19
Payer: COMMERCIAL

## 2023-12-19 VITALS
DIASTOLIC BLOOD PRESSURE: 78 MMHG | WEIGHT: 308 LBS | BODY MASS INDEX: 44.09 KG/M2 | TEMPERATURE: 97.9 F | HEIGHT: 70 IN | RESPIRATION RATE: 20 BRPM | OXYGEN SATURATION: 97 % | SYSTOLIC BLOOD PRESSURE: 122 MMHG | HEART RATE: 62 BPM

## 2023-12-19 DIAGNOSIS — M17.10 PATELLOFEMORAL ARTHRITIS: ICD-10-CM

## 2023-12-19 DIAGNOSIS — R42 DIZZINESS: ICD-10-CM

## 2023-12-19 DIAGNOSIS — Z82.49 FAMILY HISTORY OF PREMATURE CAD: ICD-10-CM

## 2023-12-19 DIAGNOSIS — M79.641 BILATERAL HAND PAIN: ICD-10-CM

## 2023-12-19 DIAGNOSIS — I83.92 VARICOSE VEINS OF LEFT LOWER LEG: ICD-10-CM

## 2023-12-19 DIAGNOSIS — G89.29 CHRONIC PAIN OF BOTH SHOULDERS: ICD-10-CM

## 2023-12-19 DIAGNOSIS — R55 NEAR SYNCOPE: ICD-10-CM

## 2023-12-19 DIAGNOSIS — M79.605 LEFT LEG PAIN: ICD-10-CM

## 2023-12-19 DIAGNOSIS — E55.9 VITAMIN D DEFICIENCY: ICD-10-CM

## 2023-12-19 DIAGNOSIS — M25.561 CHRONIC PAIN OF BOTH KNEES: ICD-10-CM

## 2023-12-19 DIAGNOSIS — M25.562 CHRONIC PAIN OF BOTH KNEES: ICD-10-CM

## 2023-12-19 DIAGNOSIS — M25.522 BILATERAL ELBOW JOINT PAIN: ICD-10-CM

## 2023-12-19 DIAGNOSIS — K21.9 GASTROESOPHAGEAL REFLUX DISEASE, UNSPECIFIED WHETHER ESOPHAGITIS PRESENT: ICD-10-CM

## 2023-12-19 DIAGNOSIS — M25.521 BILATERAL ELBOW JOINT PAIN: ICD-10-CM

## 2023-12-19 DIAGNOSIS — M25.511 CHRONIC PAIN OF BOTH SHOULDERS: ICD-10-CM

## 2023-12-19 DIAGNOSIS — E78.6 ABNORMALLY LOW HIGH DENSITY LIPOPROTEIN (HDL) CHOLESTEROL WITH HYPERTRIGLYCERIDEMIA: Primary | ICD-10-CM

## 2023-12-19 DIAGNOSIS — R06.83 SNORING: ICD-10-CM

## 2023-12-19 DIAGNOSIS — M79.642 BILATERAL HAND PAIN: ICD-10-CM

## 2023-12-19 DIAGNOSIS — M79.89 LEFT LEG SWELLING: ICD-10-CM

## 2023-12-19 DIAGNOSIS — E78.1 ABNORMALLY LOW HIGH DENSITY LIPOPROTEIN (HDL) CHOLESTEROL WITH HYPERTRIGLYCERIDEMIA: Primary | ICD-10-CM

## 2023-12-19 DIAGNOSIS — R53.83 OTHER FATIGUE: ICD-10-CM

## 2023-12-19 DIAGNOSIS — G89.29 CHRONIC PAIN OF BOTH KNEES: ICD-10-CM

## 2023-12-19 DIAGNOSIS — Z86.19 HISTORY OF HELICOBACTER PYLORI INFECTION: ICD-10-CM

## 2023-12-19 DIAGNOSIS — M25.50 MULTIPLE JOINT PAIN: ICD-10-CM

## 2023-12-19 DIAGNOSIS — M25.512 CHRONIC PAIN OF BOTH SHOULDERS: ICD-10-CM

## 2023-12-19 RX ORDER — LANOLIN ALCOHOL/MO/W.PET/CERES
1000 CREAM (GRAM) TOPICAL DAILY
COMMUNITY

## 2023-12-19 RX ORDER — MELATONIN 10 MG
TABLET, SUBLINGUAL SUBLINGUAL
COMMUNITY

## 2023-12-19 NOTE — PROGRESS NOTES
Subjective   Hank Mayorga is a 33 y.o. male who presents today to establish care and in follow-up of hypertriglyceridemia with low HDL, H. pylori, presyncopal episode, family history of CAD, fatigue, snoring, HAYLEE, and specialists.  He is also having issues with losing weight, bilateral joint pain, and has significant varicose veins, swelling, and heat noted left lower extremity    History of Present Illness     Previously established with KIERRA Thomas.  Due to scheduling, he is changing PCP.    He has a hard time with weight. He has times that is ok and times that weight goes back up. Things do not change that much and weight up and down.   Hypertriglyceridemia with low HDL-started on Lovaza 8/2023.  He did eat biscuits and gravy prior to his labs 8/2023.  He reports it was 8 hours before his labs were performed but it was the same day because he wakes up very early.  History of TG up to 900s. No history of pancreatitis.   Possible vitamin D deficiency-patient with symmetric bilateral joint pain in his shoulders that improved with taking vitamin D 10,000 IU daily.    History of H. pylori-on no medications. Treated years ago. Still has stomach issues. No medication for treatment. May have symptoms once monthly. Has to watch what he eats for a day or 2 then resolves.     Presyncopal episode-he reported he was in the shower and felt like he was going to pass out.  He caught himself and rested and felt okay.  He stayed home that day.  Family history of CAD and dad had multiple MI starting at the age of 40.  Family history CAD-dad with multiple MI starting at the age of 40. PGF with CAD as well. Has sibling- a fib.     Fatigue, snoring-for to sleep medicine 8/2023.  He had home sleep study but reports he started to panic and was unable to tolerate anything on his ears or the device on his finger.  Unable to complete home test.    Varicose veins, leg swelling-left-Left leg- varicose veins. He had posteriorly as a kid  then worsening with increased varicose veins in left leg. Not painful but can get sore. In the winter, gets hot to touch and dry and itchy. Right leg is ok.   No US in 10 years. Had clot then checked and told was ok.    Multiple joint pain-Started having issues with bilateral shoulders- same time, same day without injury. He was ok and tried to ride it out. Tried Ibuprofen without improvement. He then started vitamin D and improved.   Left and right shoulder injury in the past.   Sometimes will hurt and sometimes it will not. Has tried to stop vitamin D and has had recurrence. Taking vitamin D 10,000 IU daily.   Bilateral knees hurts bilateral hands (worse in the winter), elbows- sometimes 1 hand or elbow will hurt worse but shoulders hurt together.   Osteoarthritis-knees, patellofemoral syndrome-following with orthopedic surgery with injections.    Patient's specialists:  Orthopedic surgery-Carlos Liu PA-C-last appointment 12/2023 for patellofemoral arthritis.  Injection given.  Sleep medicine-KIERRA Leroy-last appointment 11/2023 for hypersomnia, snoring, fatigue, witnessed episode of apnea, morbid obesity.  Ordered home sleep study.  Has not completed.    Past Medical History:   Diagnosis Date    Knee swelling 3 yrs ago    Not sure on the exact date     Social History     Tobacco Use    Smoking status: Never     Passive exposure: Never    Smokeless tobacco: Never   Vaping Use    Vaping Use: Never used   Substance Use Topics    Alcohol use: No    Drug use: Never   No past surgical history on file.   Family History   Problem Relation Age of Onset    Cancer Other         SKIN    Diabetes Other     Heart disease Other     Hypertension Other          The following portions of the patient's history were reviewed and updated as appropriate: allergies, current medications, past family history, past medical history, past social history, past surgical history and problem list.    Review of  Systems    Objective   Vitals:    12/19/23 1452   BP: 122/78   Pulse: 62   Resp: 20   Temp: 97.9 °F (36.6 °C)   SpO2: 97%     Body mass index is 44.19 kg/m².      Physical Exam  Vitals and nursing note reviewed.   Constitutional:       Appearance: He is well-developed.   HENT:      Head: Normocephalic and atraumatic.      Right Ear: External ear normal.      Left Ear: External ear normal.   Eyes:      Conjunctiva/sclera: Conjunctivae normal.   Neck:      Thyroid: No thyroid mass or thyromegaly.      Vascular: No carotid bruit.      Trachea: No tracheal deviation.   Cardiovascular:      Rate and Rhythm: Normal rate and regular rhythm.      Heart sounds: Normal heart sounds.   Pulmonary:      Effort: Pulmonary effort is normal.      Breath sounds: Normal breath sounds.   Skin:     General: Skin is warm and dry.   Neurological:      Mental Status: He is alert and oriented to person, place, and time.      Gait: Gait normal.   Psychiatric:         Behavior: Behavior normal.         Thought Content: Thought content normal.         Assessment & Plan   Diagnoses and all orders for this visit:    1. Abnormally low high density lipoprotein (HDL) cholesterol with hypertriglyceridemia (Primary)  -     Comprehensive Metabolic Panel  -     Hemoglobin A1c  -     CK  -     Lipid Panel With LDL / HDL Ratio    2. Vitamin D deficiency  -     Vitamin D,25-Hydroxy    3. History of Helicobacter pylori infection    4. Gastroesophageal reflux disease, unspecified whether esophagitis present    5. Near syncope  -     Iron and TIBC  -     Ferritin  -     Vitamin B12 & Folate  -     Urinalysis With Culture If Indicated -  -     Magnesium    6. Dizziness  -     CBC & Differential  -     Comprehensive Metabolic Panel  -     Iron and TIBC  -     Ferritin  -     Vitamin B12 & Folate  -     Urinalysis With Culture If Indicated -  -     Magnesium    7. Family history of premature CAD    8. Snoring  -     CBC & Differential  -     Comprehensive  Metabolic Panel    9. Other fatigue  -     CBC & Differential  -     Comprehensive Metabolic Panel  -     Iron and TIBC  -     Ferritin  -     Vitamin B12 & Folate  -     Vitamin D,25-Hydroxy    10. Varicose veins of left lower leg  -     Duplex Venous Lower Extremity - Left CAR; Future    11. Left leg swelling  -     Duplex Venous Lower Extremity - Left CAR; Future    12. Left leg pain  -     Duplex Venous Lower Extremity - Left CAR; Future    13. Multiple joint pain  -     JOSÉ MIGUEL With / DsDNA, RNP, Sjogrens A / B, Smith  -     C-reactive Protein  -     Cyclic Citrul Peptide Antibody, IgG / IgA  -     Rheumatoid Factor  -     Sedimentation Rate  -     Uric Acid    14. Patellofemoral arthritis  -     JOSÉ MIGUEL With / DsDNA, RNP, Sjogrens A / B, Smith  -     C-reactive Protein  -     Cyclic Citrul Peptide Antibody, IgG / IgA  -     Rheumatoid Factor  -     Sedimentation Rate  -     Uric Acid    15. Chronic pain of both knees  -     JOSÉ MIGUEL With / DsDNA, RNP, Sjogrens A / B, Smith  -     C-reactive Protein  -     Cyclic Citrul Peptide Antibody, IgG / IgA  -     Rheumatoid Factor  -     Sedimentation Rate  -     Uric Acid    16. Chronic pain of both shoulders  -     JOSÉ MIGUEL With / DsDNA, RNP, Sjogrens A / B, Smith  -     C-reactive Protein  -     Cyclic Citrul Peptide Antibody, IgG / IgA  -     Rheumatoid Factor  -     Sedimentation Rate  -     Uric Acid    17. Bilateral hand pain  -     JOSÉ MIGUEL With / DsDNA, RNP, Sjogrens A / B, Smith  -     C-reactive Protein  -     Cyclic Citrul Peptide Antibody, IgG / IgA  -     Rheumatoid Factor  -     Sedimentation Rate  -     Uric Acid    18. Bilateral elbow joint pain  -     JOSÉ MIGUEL With / DsDNA, RNP, Sjogrens A / B, Smith  -     C-reactive Protein  -     Cyclic Citrul Peptide Antibody, IgG / IgA  -     Rheumatoid Factor  -     Sedimentation Rate  -     Uric Acid        Assessment and Plan  Patient will have fasting labs. Call if no results in 1 week. Stability of conditions, plan, follow up, and  further recommendations pending labs.  Patient will likely need follow-up with me in 3 months pending lab results.    Morbid obesity, difficulty losing weight- Patient reports he will have trouble keeping weight off and will lose and gain despite and significant changes in diet and exercise.  We will check labs.  Thyroid was normal 8/2023.  He will need to complete sleep study to ensure no sleep apnea that is contributing to his symptoms.  We could also consider pharmacological treatment at follow-up if needed.  Hypertriglyceridemia with low HDL- Continue Lovaza 2 g twice daily.  I discussed risks of significant elevation in triglycerides with patient, including cardiovascular and pancreatitis could contribute to diabetes.  I will recheck labs when fasting, counseled patient not to have a large meal the night prior to the labs, and will ensure no diabetes.  Possible vitamin D deficiency- Continue vitamin D for now.  Dosing recommendations pending labs  History of H. Pylori- He does have history of H. pylori that he thinks was treated.  He does continue with some intermittent GI symptoms that last a couple days and occur about once monthly.  I discussed possible GERD versus gallbladder etiology.  We could consider H. pylori retesting with breath test, consideration of daily medication if needed or gallbladder workup if persistent intermittent symptoms.  Presyncopal episode, dizziness- I discussed my concerns with patient regarding his symptoms today.  He does have family history of premature coronary artery disease and has likely sleep apnea that could contribute to heart arrhythmia as well as swelling and significant varicosities left lower extremity with risk for DVT that could progress to PE.  These are all life-threatening potential causes for symptoms.  If he has any recurrence of symptoms, he is to call 911, as he is a fair distance from the nearest hospital.  He should have EKG with the paramedics and  evaluation then either transport by private vehicle or ambulance to the ER for workup.  If no recurrence, we will still need to consider CTA chest pending venous duplex and consideration of cardiology evaluation after sleep testing.  Patient verbalized understanding and agreement with plan and recommendations.  Family history CAD-He will likely need appointment with cardiology to evaluate and consideration of workup.  He will need sooner evaluation if he has any recurrence of dizziness or presyncopal episodes or develops any chest pain, shortness of air, palpitations, or other symptoms.  Possible HAYLEE, fatigue, snoring- He was unable to complete his home study due to increased anxiety once he put the device on.  I asked that he contact the sleep medicine office again and ensure they get a message to the provider to determine further recommendations for testing.  I counseled him at length regarding the imperative need to have sleep testing, treat sleep apnea if diagnosed, and the risks of untreated sleep apnea.  Varicose veins, leg swelling-left- It sounds like he may have a history of clot-superficial or deep in the past that he reports resolved.  However, he does have significant varicosities only of the left lower extremity and swelling of the left leg.  He has some warmth intermittent and pain.  I will refer for venous duplex left lower extremity.  If he is negative for DVT, he should use compression stockings.  We will consider vascular surgery if he would like to proceed with any procedures to treat  Multiple joint pain- Patient with intermittent bilateral shoulder elbow, and hand pain and has chronic knee pain and knee arthritis.  I will check autoimmune testing with his labs.  Consideration of further workup if needed.  Osteoarthritis- Continue follow-up with orthopedist as directed by them.    I spent 35 minutes caring for Hank Mayorga on this date of service. This time includes time spent by me in the  following activities as necessary: preparing for the visit, reviewing tests, specialists records and previous visits, obtaining and/or reviewing a separately obtained history, performing a medically appropriate exam and/or evaluation, counseling and educating the patient, family, caregiver, referring and/or communicating with other healthcare professionals, documenting information in the medical record, independently interpreting results and communicating that information with the patient, family, caregiver, and developing a medically appropriate treatment plan with consideration of other conditions, medications, and treatments.

## 2023-12-26 LAB
25(OH)D3+25(OH)D2 SERPL-MCNC: 25.9 NG/ML (ref 30–100)
ALBUMIN SERPL-MCNC: 4.5 G/DL (ref 4.1–5.1)
ALBUMIN/GLOB SERPL: 1.7 {RATIO} (ref 1.2–2.2)
ALP SERPL-CCNC: 78 IU/L (ref 44–121)
ALT SERPL-CCNC: 38 IU/L (ref 0–44)
ANA SER QL: NEGATIVE
APPEARANCE UR: CLEAR
AST SERPL-CCNC: 26 IU/L (ref 0–40)
BACTERIA #/AREA URNS HPF: NORMAL /[HPF]
BASOPHILS # BLD AUTO: 0.1 X10E3/UL (ref 0–0.2)
BASOPHILS NFR BLD AUTO: 1 %
BILIRUB SERPL-MCNC: 0.9 MG/DL (ref 0–1.2)
BILIRUB UR QL STRIP: NEGATIVE
BUN SERPL-MCNC: 13 MG/DL (ref 6–20)
BUN/CREAT SERPL: 14 (ref 9–20)
CALCIUM SERPL-MCNC: 9.9 MG/DL (ref 8.7–10.2)
CASTS URNS QL MICRO: NORMAL /LPF
CCP IGA+IGG SERPL IA-ACNC: 3 UNITS (ref 0–19)
CHLORIDE SERPL-SCNC: 102 MMOL/L (ref 96–106)
CHOLEST SERPL-MCNC: 229 MG/DL (ref 100–199)
CK SERPL-CCNC: 125 U/L (ref 49–439)
CO2 SERPL-SCNC: 23 MMOL/L (ref 20–29)
COLOR UR: YELLOW
CREAT SERPL-MCNC: 0.91 MG/DL (ref 0.76–1.27)
CRP SERPL-MCNC: 4 MG/L (ref 0–10)
EGFRCR SERPLBLD CKD-EPI 2021: 113 ML/MIN/1.73
EOSINOPHIL # BLD AUTO: 0.1 X10E3/UL (ref 0–0.4)
EOSINOPHIL NFR BLD AUTO: 1 %
EPI CELLS #/AREA URNS HPF: NORMAL /HPF (ref 0–10)
ERYTHROCYTE [DISTWIDTH] IN BLOOD BY AUTOMATED COUNT: 13.3 % (ref 11.6–15.4)
ERYTHROCYTE [SEDIMENTATION RATE] IN BLOOD BY WESTERGREN METHOD: 5 MM/HR (ref 0–15)
FERRITIN SERPL-MCNC: 292 NG/ML (ref 30–400)
FOLATE SERPL-MCNC: 13.2 NG/ML
GLOBULIN SER CALC-MCNC: 2.7 G/DL (ref 1.5–4.5)
GLUCOSE SERPL-MCNC: 83 MG/DL (ref 70–99)
GLUCOSE UR QL STRIP: NEGATIVE
HBA1C MFR BLD: 5.7 % (ref 4.8–5.6)
HCT VFR BLD AUTO: 46.5 % (ref 37.5–51)
HDLC SERPL-MCNC: 35 MG/DL
HGB BLD-MCNC: 15.5 G/DL (ref 13–17.7)
HGB UR QL STRIP: NEGATIVE
IMM GRANULOCYTES # BLD AUTO: 0 X10E3/UL (ref 0–0.1)
IMM GRANULOCYTES NFR BLD AUTO: 0 %
IRON SATN MFR SERPL: 30 % (ref 15–55)
IRON SERPL-MCNC: 90 UG/DL (ref 38–169)
KETONES UR QL STRIP: NEGATIVE
LDLC SERPL CALC-MCNC: 166 MG/DL (ref 0–99)
LDLC/HDLC SERPL: 4.7 RATIO (ref 0–3.6)
LEUKOCYTE ESTERASE UR QL STRIP: NEGATIVE
LYMPHOCYTES # BLD AUTO: 2.8 X10E3/UL (ref 0.7–3.1)
LYMPHOCYTES NFR BLD AUTO: 28 %
MAGNESIUM SERPL-MCNC: 2 MG/DL (ref 1.6–2.3)
MCH RBC QN AUTO: 29.9 PG (ref 26.6–33)
MCHC RBC AUTO-ENTMCNC: 33.3 G/DL (ref 31.5–35.7)
MCV RBC AUTO: 90 FL (ref 79–97)
MICRO URNS: NORMAL
MICRO URNS: NORMAL
MONOCYTES # BLD AUTO: 0.7 X10E3/UL (ref 0.1–0.9)
MONOCYTES NFR BLD AUTO: 7 %
NEUTROPHILS # BLD AUTO: 6.4 X10E3/UL (ref 1.4–7)
NEUTROPHILS NFR BLD AUTO: 63 %
NITRITE UR QL STRIP: NEGATIVE
PH UR STRIP: 5.5 [PH] (ref 5–7.5)
PLATELET # BLD AUTO: 259 X10E3/UL (ref 150–450)
POTASSIUM SERPL-SCNC: 3.9 MMOL/L (ref 3.5–5.2)
PROT SERPL-MCNC: 7.2 G/DL (ref 6–8.5)
PROT UR QL STRIP: NEGATIVE
RBC # BLD AUTO: 5.18 X10E6/UL (ref 4.14–5.8)
RBC #/AREA URNS HPF: NORMAL /HPF (ref 0–2)
RHEUMATOID FACT SERPL-ACNC: <10 IU/ML
SODIUM SERPL-SCNC: 144 MMOL/L (ref 134–144)
SP GR UR STRIP: 1.03 (ref 1–1.03)
TIBC SERPL-MCNC: 296 UG/DL (ref 250–450)
TRIGL SERPL-MCNC: 153 MG/DL (ref 0–149)
UIBC SERPL-MCNC: 206 UG/DL (ref 111–343)
URATE SERPL-MCNC: 8.3 MG/DL (ref 3.8–8.4)
URINALYSIS REFLEX: NORMAL
UROBILINOGEN UR STRIP-MCNC: 0.2 MG/DL (ref 0.2–1)
VIT B12 SERPL-MCNC: 747 PG/ML (ref 232–1245)
VLDLC SERPL CALC-MCNC: 28 MG/DL (ref 5–40)
WBC # BLD AUTO: 10 X10E3/UL (ref 3.4–10.8)
WBC #/AREA URNS HPF: NORMAL /HPF (ref 0–5)

## 2024-01-02 ENCOUNTER — HOSPITAL ENCOUNTER (OUTPATIENT)
Dept: CARDIOLOGY | Facility: HOSPITAL | Age: 34
Discharge: HOME | End: 2024-01-02
Admitting: PHYSICIAN ASSISTANT
Payer: COMMERCIAL

## 2024-01-02 DIAGNOSIS — M79.89 LEFT LEG SWELLING: ICD-10-CM

## 2024-01-02 DIAGNOSIS — I83.92 VARICOSE VEINS OF LEFT LOWER LEG: ICD-10-CM

## 2024-01-02 DIAGNOSIS — M79.605 LEFT LEG PAIN: ICD-10-CM

## 2024-01-02 LAB
BH CV LOW VAS LEFT GREATER SAPH AK VESSEL: 1
BH CV LOW VAS LEFT GREATER SAPH BK VESSEL: 1
BH CV LOWER VASCULAR LEFT COMMON FEMORAL AUGMENT: NORMAL
BH CV LOWER VASCULAR LEFT COMMON FEMORAL COMPETENT: NORMAL
BH CV LOWER VASCULAR LEFT COMMON FEMORAL COMPRESS: NORMAL
BH CV LOWER VASCULAR LEFT COMMON FEMORAL PHASIC: NORMAL
BH CV LOWER VASCULAR LEFT COMMON FEMORAL SPONT: NORMAL
BH CV LOWER VASCULAR LEFT DISTAL FEMORAL COMPRESS: NORMAL
BH CV LOWER VASCULAR LEFT GASTRONEMIUS COMPRESS: NORMAL
BH CV LOWER VASCULAR LEFT GREATER SAPH AK COMPETENT: NORMAL
BH CV LOWER VASCULAR LEFT GREATER SAPH AK COMPRESS: NORMAL
BH CV LOWER VASCULAR LEFT GREATER SAPH BK COMPETENT: NORMAL
BH CV LOWER VASCULAR LEFT GREATER SAPH BK COMPRESS: NORMAL
BH CV LOWER VASCULAR LEFT LESSER SAPH COMPRESS: NORMAL
BH CV LOWER VASCULAR LEFT MID FEMORAL AUGMENT: NORMAL
BH CV LOWER VASCULAR LEFT MID FEMORAL COMPETENT: NORMAL
BH CV LOWER VASCULAR LEFT MID FEMORAL COMPRESS: NORMAL
BH CV LOWER VASCULAR LEFT MID FEMORAL PHASIC: NORMAL
BH CV LOWER VASCULAR LEFT MID FEMORAL SPONT: NORMAL
BH CV LOWER VASCULAR LEFT PERONEAL COMPRESS: NORMAL
BH CV LOWER VASCULAR LEFT POPLITEAL AUGMENT: NORMAL
BH CV LOWER VASCULAR LEFT POPLITEAL COMPETENT: NORMAL
BH CV LOWER VASCULAR LEFT POPLITEAL COMPRESS: NORMAL
BH CV LOWER VASCULAR LEFT POPLITEAL PHASIC: NORMAL
BH CV LOWER VASCULAR LEFT POPLITEAL SPONT: NORMAL
BH CV LOWER VASCULAR LEFT POSTERIOR TIBIAL COMPRESS: NORMAL
BH CV LOWER VASCULAR LEFT PROFUNDA FEMORAL COMPRESS: NORMAL
BH CV LOWER VASCULAR LEFT PROXIMAL FEMORAL COMPRESS: NORMAL
BH CV LOWER VASCULAR LEFT SAPHENOFEMORAL JUNCTION COMPRESS: NORMAL
BH CV LOWER VASCULAR RIGHT COMMON FEMORAL AUGMENT: NORMAL
BH CV LOWER VASCULAR RIGHT COMMON FEMORAL COMPETENT: NORMAL
BH CV LOWER VASCULAR RIGHT COMMON FEMORAL COMPRESS: NORMAL
BH CV LOWER VASCULAR RIGHT COMMON FEMORAL PHASIC: NORMAL
BH CV LOWER VASCULAR RIGHT COMMON FEMORAL SPONT: NORMAL

## 2024-01-02 PROCEDURE — 93971 EXTREMITY STUDY: CPT

## 2024-01-03 DIAGNOSIS — E78.2 MIXED HYPERLIPIDEMIA: Primary | ICD-10-CM

## 2024-01-03 RX ORDER — ATORVASTATIN CALCIUM 20 MG/1
20 TABLET, FILM COATED ORAL NIGHTLY
Qty: 90 TABLET | Refills: 0 | Status: SHIPPED | OUTPATIENT
Start: 2024-01-03

## 2024-01-16 ENCOUNTER — APPOINTMENT (OUTPATIENT)
Dept: CT IMAGING | Facility: HOSPITAL | Age: 34
End: 2024-01-16
Payer: COMMERCIAL

## 2024-01-16 ENCOUNTER — HOSPITAL ENCOUNTER (EMERGENCY)
Facility: HOSPITAL | Age: 34
Discharge: HOME | End: 2024-01-16
Attending: EMERGENCY MEDICINE | Admitting: EMERGENCY MEDICINE
Payer: COMMERCIAL

## 2024-01-16 VITALS
DIASTOLIC BLOOD PRESSURE: 79 MMHG | HEART RATE: 58 BPM | WEIGHT: 290 LBS | SYSTOLIC BLOOD PRESSURE: 125 MMHG | RESPIRATION RATE: 16 BRPM | TEMPERATURE: 97.5 F | HEIGHT: 70 IN | BODY MASS INDEX: 41.52 KG/M2 | OXYGEN SATURATION: 96 %

## 2024-01-16 DIAGNOSIS — K59.00 CONSTIPATION, UNSPECIFIED CONSTIPATION TYPE: ICD-10-CM

## 2024-01-16 DIAGNOSIS — R10.9 ACUTE ABDOMINAL PAIN: Primary | ICD-10-CM

## 2024-01-16 PROCEDURE — 99284 EMERGENCY DEPT VISIT MOD MDM: CPT

## 2024-01-16 PROCEDURE — 74176 CT ABD & PELVIS W/O CONTRAST: CPT

## 2024-01-16 RX ORDER — LACTULOSE 10 G/15ML
20 SOLUTION ORAL 2 TIMES DAILY
Qty: 180 ML | Refills: 0 | Status: SHIPPED | OUTPATIENT
Start: 2024-01-16 | End: 2024-01-19

## 2024-01-16 RX ORDER — LACTULOSE 10 G/15ML
30 SOLUTION ORAL ONCE
Status: COMPLETED | OUTPATIENT
Start: 2024-01-16 | End: 2024-01-16

## 2024-01-16 RX ORDER — SODIUM PHOSPHATE,MONO-DIBASIC 19G-7G/118
1 ENEMA (ML) RECTAL ONCE
Status: COMPLETED | OUTPATIENT
Start: 2024-01-16 | End: 2024-01-16

## 2024-01-16 RX ORDER — MAGNESIUM HYDROXIDE 1200 MG/15ML
1 LIQUID ORAL ONCE
Qty: 1 ENEMA | Refills: 0 | Status: SHIPPED | OUTPATIENT
Start: 2024-01-16 | End: 2024-01-16

## 2024-01-16 RX ADMIN — LACTULOSE 30 G: 20 SOLUTION ORAL at 13:06

## 2024-01-16 RX ADMIN — SODIUM PHOSPHATE 1 ENEMA: 7; 19 ENEMA RECTAL at 13:29

## 2024-01-16 NOTE — ED PROVIDER NOTES
[facility]    EMERGENCY DEPARTMENT ENCOUNTER      Pt Name: Hank Mayorga  MRN: [id number]  YOB: 1990  Date of evaluation: 1/16/2024  Provider: Femi Flannery MD    CHIEF COMPLAINT       Chief Complaint   Patient presents with    Constipation         HISTORY OF PRESENT ILLNESS   Hank Mayorga is a 33 y.o. male who presents to the emergency department with complaint of constipation and abdominal pain.  Patient states that he has been unable to have a bowel movement since last Tuesday.  He has developed intermittently intense cramping primarily right-sided abdominal pain.  No nausea, vomiting, fever, or chills.  No prior history of abdominal surgeries.  Has been trying over-the-counter laxatives at home without relief.      Nursing notes were reviewed.    REVIEW OF SYSTEMS     ROS:  A chief complaint appropriate review of systems was completed and is negative except as noted in the HPI.      PAST MEDICAL HISTORY     Past Medical History:   Diagnosis Date    Knee swelling 3 yrs ago    Not sure on the exact date         SURGICAL HISTORY     No past surgical history on file.      CURRENT MEDICATIONS       Current Facility-Administered Medications:     Fleets enema 1 enema, 1 enema, Rectal, Once, Femi Flannery MD    Current Outpatient Medications:     atorvastatin (Lipitor) 20 MG tablet, Take 1 tablet by mouth Every Night., Disp: 90 tablet, Rfl: 0    Cholecalciferol (Vitamin D3) 250 MCG (99065 UT) tablet, Take  by mouth., Disp: , Rfl:     lactulose (CHRONULAC) 10 GM/15ML solution, Take 30 mL by mouth 2 (Two) Times a Day for 3 days., Disp: 180 mL, Rfl: 0    omega-3 acid ethyl esters (Lovaza) 1 g capsule, Take 2 capsules by mouth 2 (Two) Times a Day., Disp: 60 capsule, Rfl: 5    sodium phosphate (FLEET) 7-19 GM/118ML enema, Insert 1 enema into the rectum 1 (One) Time for 1 dose., Disp: 1 enema, Rfl: 0    vitamin B-12 (CYANOCOBALAMIN) 1000 MCG tablet, Take 1 tablet by mouth Daily., Disp: , Rfl:  "    ALLERGIES     Patient has no known allergies.    FAMILY HISTORY       Family History   Problem Relation Age of Onset    Cancer Other         SKIN    Diabetes Other     Heart disease Other     Hypertension Other           SOCIAL HISTORY       Social History     Socioeconomic History    Marital status:    Tobacco Use    Smoking status: Never     Passive exposure: Never    Smokeless tobacco: Never   Vaping Use    Vaping Use: Never used   Substance and Sexual Activity    Alcohol use: No    Drug use: Never    Sexual activity: Yes     Partners: Female         PHYSICAL EXAM    (up to 7 for level 4, 8 or more for level 5)     Vitals:    01/16/24 1239   BP: 125/79   BP Location: Left arm   Patient Position: Sitting   Pulse: 58   Resp: 16   Temp: 97.5 °F (36.4 °C)   TempSrc: Oral   SpO2: 96%   Weight: 132 kg (290 lb)   Height: 177.8 cm (70\")       General: Awake, alert, no acute distress.  HEENT: Conjunctivae normal.  Neck: Trachea midline.  Cardiac: Heart regular rate, rhythm, no murmurs, rubs, or gallops  Lungs: Lungs are clear to auscultation, there is no wheezing, rhonchi, or rales. There is no use of accessory muscles.  Chest wall: There is no tenderness to palpation over the chest wall or over ribs  Abdomen: Mild tenderness to the right side of the abdomen.  There is no distention, rebound, or guarding.  Musculoskeletal: No deformity.  Neuro: Alert and oriented x 4.  Dermatology: Skin is warm and dry  Psych: Mentation is grossly normal, cognition is grossly normal. Affect is appropriate.        DIAGNOSTIC RESULTS     EKG: All EKGs are interpreted by the Emergency Department Physician who either signs or Co-signs this chart in the absence of a cardiologist.    No orders to display         RADIOLOGY:   [x] Radiologist's Report Reviewed:  CT Abdomen Pelvis Without Contrast   Final Result   Impression:   No acute process. Fatty liver noted.                  Electronically Signed: Betty Thompson MD     1/16/2024 " 1:07 PM EST     Workstation ID: [id number]          I ordered and independently reviewed the above noted radiographic studies.        LABS:    I have reviewed and interpreted all of the currently available lab results from this visit (if applicable):  Results for orders placed or performed during the hospital encounter of 01/02/24   Duplex Venous Lower Extremity - Left CAR   Result Value Ref Range    Left Greater Saph AK Vessel 1.0     Left Greater Saph BK Vessel 1.0     Right Common Femoral Spont Y     Right Common Femoral Competent Y     Right Common Femoral Phasic Y     Right Common Femoral Compress C     Right Common Femoral Augment Y     Left Common Femoral Spont Y     Left Common Femoral Competent Y     Left Common Femoral Phasic Y     Left Common Femoral Compress C     Left Common Femoral Augment Y     Left Saphenofemoral Junction Compress C     Left Profunda Femoral Compress C     Left Proximal Femoral Compress C     Left Mid Femoral Spont Y     Left Mid Femoral Competent Y     Left Mid Femoral Phasic Y     Left Mid Femoral Compress C     Left Mid Femoral Augment Y     Left Distal Femoral Compress C     Left Popliteal Spont Y     Left Popliteal Competent Y     Left Popliteal Phasic Y     Left Popliteal Compress C     Left Popliteal Augment Y     Left Posterior Tibial Compress C     Left Peroneal Compress C     Left Gastronemius Compress C     Left Greater Saph AK Competent N     Left Greater Saph AK Compress C     Left Greater Saph BK Competent N     Left Greater Saph BK Compress C     Left Lesser Saph Compress C         If labs were ordered, I independently reviewed the results and considered them in treating the patient.      EMERGENCY DEPARTMENT COURSE and DIFFERENTIAL DIAGNOSIS/MDM:   Vitals:  AS OF 13:17 EST    BP - 125/79  HR - 58  TEMP - 97.5 °F (36.4 °C) (Oral)  O2 SATS - 96%        Discussion below represents my analysis of pertinent findings related to patient's condition, differential diagnosis,  treatment plan and final disposition.      Differential diagnosis:  The differential diagnosis associated with the patient's presentation includes: Constipation, fecal impaction, small bowel obstruction, appendicitis, biliary pathology      Independent interpretations (ECG/rhythm strip/X-ray/US/CT scan): I independently interpreted the patient's abdominal CT and see no evidence of obstructive change or fecal impaction.  I dependently interpreted the patient's cardiac monitor which showed sinus rhythm.      Care significantly affected by Social Determinants of Health (housing and economic circumstances, unemployment)    [] Yes     [x] No   If yes, Patient's care significantly limited by  Social Determinants of Health including:    [] Inadequate housing    [] Low income    [] Alcoholism and drug addiction in family    [] Problems related to primary support group    [] Unemployment    [] Problems related to employment    [] Other Social Determinants of Health:       I considered prescription management with:    [] Pain medication:   [] Antiviral:   [] Antibiotic:   [x] Other: Patient rx lactuose and fleet    Additional orders considered but not ordered:  The following testing was considered but ultimately not selected after discussion with patient/family: Considered laboratory testing, however feel that patient's symptoms are related to constipation.  I am still obtaining abdominal CT to rule out any evidence of obstruction or fecal impaction but my suspicion for biliary pathology or other serious surgical process is low.    ED Course:    ED Course as of 01/16/24 1317   Tue Jan 16, 2024   1316 On reexamination, the patient is very well-appearing and nontoxic.  He is resting comfortably in bed in no acute distress.  Vital signs remain normal.  I have reexamined his abdomen and it remains mildly tender diffusely without any real focal finding, distention, rebound, or guarding.  CT scan shows no acute intra-abdominal  pathology including no evidence of fecal impaction or bowel obstruction as well as no other alternate pathology.  Feel that he is safe for discharge home at this time with symptomatic treatment and close outpatient follow-up.  I counseled the patient to return to the emergency department if he experiences worsening pain, fever, vomiting, or any other concerning symptoms. [NS]      ED Course User Index  [NS] Femi Flannery MD           I had a discussion with the patient/family regarding diagnosis, diagnostic results, treatment plan, and medications.  The patient/family indicated understanding of these instructions.  I spent adequate time at the bedside preceding discharge necessary to personally discuss the aftercare instructions, giving patient education, providing explanations of the results of our evaluations/findings, and my decision making to assure that the patient/family understand the plan of care.  Time was allotted to answer questions at that time and throughout the ED course.  Emphasis was placed on timely follow-up after discharge.  I also discussed the potential for the development of an acute emergent condition requiring further evaluation, admission, or even surgical intervention. I discussed that we found nothing during the visit today indicating the need for further workup, admission, or the presence of an unstable medical condition.  I encouraged the patient to return to the emergency department immediately for ANY concerns, worsening, new complaints, or if symptoms persist and unable to seek follow-up in a timely fashion.  The patient/family expressed understanding and agreement with this plan.  The patient will follow-up with their PCP in 1-2 days for reevaluation.           PROCEDURES:  Procedures    CRITICAL CARE TIME        FINAL IMPRESSION      1. Acute abdominal pain    2. Constipation, unspecified constipation type          DISPOSITION/PLAN     ED Disposition       ED Disposition    Discharge    Condition   Stable    Comment   --                 Comment: Please note this report has been produced using speech recognition software.      Femi Flannery MD  Attending Emergency Physician             Femi Flannery MD  01/16/24 2110

## 2024-01-30 ENCOUNTER — PATIENT MESSAGE (OUTPATIENT)
Dept: FAMILY MEDICINE CLINIC | Facility: CLINIC | Age: 34
End: 2024-01-30
Payer: COMMERCIAL

## 2024-01-30 DIAGNOSIS — E66.01 CLASS 3 SEVERE OBESITY WITH SERIOUS COMORBIDITY AND BODY MASS INDEX (BMI) OF 40.0 TO 44.9 IN ADULT, UNSPECIFIED OBESITY TYPE: Primary | ICD-10-CM

## 2024-02-09 NOTE — TELEPHONE ENCOUNTER
From: Hank Mayorga  To: Sis Simental  Sent: 1/30/2024 3:33 PM EST  Subject: Weight loss medicine     So to touch base again on the weight loss meds. My pharmacy at work said they have Zepbound in stock. Would this be something you recommend on using since the wegovy is out of stock until spring?

## 2024-02-23 RX ORDER — OMEGA-3-ACID ETHYL ESTERS 1 G/1
2 CAPSULE, LIQUID FILLED ORAL 2 TIMES DAILY
Qty: 360 CAPSULE | Refills: 0 | Status: SHIPPED | OUTPATIENT
Start: 2024-02-23

## 2024-02-27 ENCOUNTER — PATIENT MESSAGE (OUTPATIENT)
Dept: FAMILY MEDICINE CLINIC | Facility: CLINIC | Age: 34
End: 2024-02-27
Payer: COMMERCIAL

## 2024-03-01 NOTE — TELEPHONE ENCOUNTER
From: Hank Mayorga  To: Sis Simental  Sent: 2/27/2024 7:16 PM EST  Subject: Zepbound     So far so good on the zepbound. It’s curbed my appetite a little. I’ve lost 5-6lbs since beginning dosage. I’d like to get bumped up to the next higher dosage if possible. I just took my 3rd shot tonight. I just don’t want to run out.

## 2024-03-31 ENCOUNTER — PATIENT MESSAGE (OUTPATIENT)
Dept: FAMILY MEDICINE CLINIC | Facility: CLINIC | Age: 34
End: 2024-03-31
Payer: COMMERCIAL

## 2024-03-31 DIAGNOSIS — E78.2 MIXED HYPERLIPIDEMIA: ICD-10-CM

## 2024-04-01 RX ORDER — ATORVASTATIN CALCIUM 20 MG/1
20 TABLET, FILM COATED ORAL NIGHTLY
Qty: 30 TABLET | Refills: 0 | Status: SHIPPED | OUTPATIENT
Start: 2024-04-01

## 2024-04-01 NOTE — TELEPHONE ENCOUNTER
According to result note patient needs F/u appt in April. Please make appt. Will give 30 days supply

## 2024-04-02 NOTE — TELEPHONE ENCOUNTER
From: Hank Mayorga  To: Sis Simental  Sent: 3/31/2024 7:37 PM EDT  Subject: Zep bound refill    I’m almost out of the 5mg zepbound can I get the next prescription for 7.5mg please.

## 2024-04-08 ENCOUNTER — TELEPHONE (OUTPATIENT)
Dept: FAMILY MEDICINE CLINIC | Facility: CLINIC | Age: 34
End: 2024-04-08

## 2024-04-29 ENCOUNTER — PATIENT MESSAGE (OUTPATIENT)
Dept: FAMILY MEDICINE CLINIC | Facility: CLINIC | Age: 34
End: 2024-04-29

## 2024-04-29 DIAGNOSIS — E78.6 ABNORMALLY LOW HIGH DENSITY LIPOPROTEIN (HDL) CHOLESTEROL WITH HYPERTRIGLYCERIDEMIA: ICD-10-CM

## 2024-04-29 DIAGNOSIS — Z82.49 FAMILY HISTORY OF PREMATURE CAD: ICD-10-CM

## 2024-04-29 DIAGNOSIS — K21.9 GASTROESOPHAGEAL REFLUX DISEASE, UNSPECIFIED WHETHER ESOPHAGITIS PRESENT: ICD-10-CM

## 2024-04-29 DIAGNOSIS — E78.2 MIXED HYPERLIPIDEMIA: Primary | ICD-10-CM

## 2024-04-29 DIAGNOSIS — E66.01 CLASS 3 SEVERE OBESITY WITH SERIOUS COMORBIDITY AND BODY MASS INDEX (BMI) OF 40.0 TO 44.9 IN ADULT, UNSPECIFIED OBESITY TYPE: ICD-10-CM

## 2024-04-29 DIAGNOSIS — E78.1 ABNORMALLY LOW HIGH DENSITY LIPOPROTEIN (HDL) CHOLESTEROL WITH HYPERTRIGLYCERIDEMIA: ICD-10-CM

## 2024-04-30 NOTE — TELEPHONE ENCOUNTER
From: Hank Mayorga  To: Sis Simental  Sent: 4/29/2024 8:11 AM EDT  Subject: Zepbound 10mg    I have one dose of 7.5 prescription. My wife found some 10mg available local. If I can get that filled that would be awesome. She said she talked with you but I needed to put in my own request.

## 2024-05-15 ENCOUNTER — OFFICE VISIT (OUTPATIENT)
Dept: FAMILY MEDICINE CLINIC | Facility: CLINIC | Age: 34
End: 2024-05-15
Payer: COMMERCIAL

## 2024-05-15 VITALS
BODY MASS INDEX: 38.22 KG/M2 | OXYGEN SATURATION: 99 % | RESPIRATION RATE: 14 BRPM | DIASTOLIC BLOOD PRESSURE: 80 MMHG | TEMPERATURE: 98.1 F | HEIGHT: 70 IN | HEART RATE: 76 BPM | WEIGHT: 267 LBS | SYSTOLIC BLOOD PRESSURE: 124 MMHG

## 2024-05-15 DIAGNOSIS — R53.83 OTHER FATIGUE: ICD-10-CM

## 2024-05-15 DIAGNOSIS — E66.01 CLASS 3 SEVERE OBESITY WITH SERIOUS COMORBIDITY AND BODY MASS INDEX (BMI) OF 40.0 TO 44.9 IN ADULT, UNSPECIFIED OBESITY TYPE: Primary | ICD-10-CM

## 2024-05-15 DIAGNOSIS — M25.50 MULTIPLE JOINT PAIN: ICD-10-CM

## 2024-05-15 DIAGNOSIS — E53.8 B12 DEFICIENCY: ICD-10-CM

## 2024-05-15 DIAGNOSIS — E78.2 MIXED HYPERLIPIDEMIA: ICD-10-CM

## 2024-05-15 DIAGNOSIS — E55.9 VITAMIN D DEFICIENCY: ICD-10-CM

## 2024-05-15 PROCEDURE — 99214 OFFICE O/P EST MOD 30 MIN: CPT | Performed by: PHYSICIAN ASSISTANT

## 2024-05-15 NOTE — PROGRESS NOTES
Subjective   Hank Mayorga is a 33 y.o. male who presents today in follow-up of initial visit, obesity, hypertriglyceridemia with low HDL, vitamin D deficiency, H. pylori, presyncopal episode, family history of CAD, fatigue, snoring, HAYLEE, and specialists.  He is also having issues with losing weight, bilateral joint pain, and has significant varicose veins, swelling, and heat noted left lower extremity    History of Present Illness       Obesity, Weight- patient has continued on Zepbound- weight is down from 308 labs to 267 lbs.   No AE with medication. Has given 2nd dose of 10 mg. He has lost 45-50 lbs.   He has a hard time with weight. He has times that is ok and times that weight goes back up. Things do not change that much and weight up and down.   Hypertriglyceridemia with low HDL-started on Lovaza 8/2023.  He did eat biscuits and gravy prior to his labs 8/2023.  He reports it was 8 hours before his labs were performed but it was the same day because he wakes up very early.  History of TG up to 900s. No history of pancreatitis.   Possible vitamin D deficiency- taking vitamin D 5000 IU daily.   Patient with symmetric bilateral joint pain in his shoulders that improved with taking vitamin D 10,000 IU daily.    History of H. pylori-on no medications. Treated years ago. Still has stomach issues. No medication for treatment. May have symptoms once monthly. Has to watch what he eats for a day or 2 then resolves.     Presyncopal episode-he reported he was in the shower and felt like he was going to pass out.  He caught himself and rested and felt okay.  He stayed home that day.  Family history of CAD and dad had multiple MI starting at the age of 40.  Family history CAD-dad with multiple MI starting at the age of 40. PGF with CAD as well. Has sibling- a fib.     Fatigue, snoring-for to sleep medicine 8/2023.  He had home sleep study but reports he started to panic and was unable to tolerate anything on his ears or  the device on his finger.  Unable to complete home test.    Varicose veins, leg swelling-left-Left leg- varicose veins. He had posteriorly as a kid then worsening with increased varicose veins in left leg. Not painful but can get sore. In the winter, gets hot to touch and dry and itchy. Right leg is ok.   No US in 10 years. Had clot then checked and told was ok.    Multiple joint pain-Started having issues with bilateral shoulders- same time, same day without injury. He was ok and tried to ride it out. Tried Ibuprofen without improvement. He then started vitamin D and improved.   Left and right shoulder injury in the past.   Sometimes will hurt and sometimes it will not. Has tried to stop vitamin D and has had recurrence. Taking vitamin D 10,000 IU daily.   Bilateral knees hurts bilateral hands (worse in the winter), elbows- sometimes 1 hand or elbow will hurt worse but shoulders hurt together.   Osteoarthritis-knees, patellofemoral syndrome-following with orthopedic surgery with injections.    Patient's specialists:  Orthopedic surgery-Carlos Liu PA-C-last appointment 12/2023 for patellofemoral arthritis.  Injection given.  Sleep medicine-KIERRA Leroy-last appointment 11/2023 for hypersomnia, snoring, fatigue, witnessed episode of apnea, morbid obesity.  Ordered home sleep study.  Has not completed.    Previously established with KIERRA Thomas.  Due to scheduling, he is changing PCP.      The following portions of the patient's history were reviewed and updated as appropriate: allergies, current medications, past family history, past medical history, past social history, past surgical history and problem list.    Review of Systems    Objective   Vitals:    05/15/24 1459   BP: 124/80   Pulse: 76   Resp: 14   Temp: 98.1 °F (36.7 °C)   SpO2: 99%     Body mass index is 38.31 kg/m².      Physical Exam  Vitals and nursing note reviewed.   Constitutional:       Appearance: He is well-developed.   HENT:       Head: Normocephalic and atraumatic.      Right Ear: External ear normal.      Left Ear: External ear normal.   Eyes:      Conjunctiva/sclera: Conjunctivae normal.   Neck:      Thyroid: No thyroid mass or thyromegaly.      Vascular: No carotid bruit.      Trachea: No tracheal deviation.   Cardiovascular:      Rate and Rhythm: Normal rate and regular rhythm.      Heart sounds: Normal heart sounds.   Pulmonary:      Effort: Pulmonary effort is normal.      Breath sounds: Normal breath sounds.   Skin:     General: Skin is warm and dry.   Neurological:      Mental Status: He is alert and oriented to person, place, and time.      Gait: Gait normal.   Psychiatric:         Behavior: Behavior normal.         Thought Content: Thought content normal.         Assessment & Plan   Diagnoses and all orders for this visit:    1. Class 3 severe obesity with serious comorbidity and body mass index (BMI) of 40.0 to 44.9 in adult, unspecified obesity type (Primary)  -     CBC & Differential  -     Comprehensive Metabolic Panel  -     Hemoglobin A1c  -     CK  -     Lipid Panel With LDL / HDL Ratio  -     Vitamin B12 & Folate  -     Vitamin D,25-Hydroxy  -     TSH  -     T4, free  -     T3, Free  -     Uric Acid  -     Urinalysis With Culture If Indicated -    2. Mixed hyperlipidemia  -     Comprehensive Metabolic Panel  -     Hemoglobin A1c  -     CK  -     Lipid Panel With LDL / HDL Ratio    3. Vitamin D deficiency  -     Comprehensive Metabolic Panel  -     Vitamin D,25-Hydroxy    4. B12 deficiency  -     CBC & Differential  -     Vitamin B12 & Folate          Assessment and Plan  Patient will have fasting labs. Call if no results in 1 week. Stability of conditions, plan, follow up, and further recommendations pending labs.  Patient will likely need follow-up with me in 3 months pending lab results.    Morbid obesity, difficulty losing weight- Patient was having trouble keeping weight off and will lose and gain despite and  significant changes in diet and exercise.  He has been advised to complete sleep study to ensure no sleep apnea that is contributing to his symptoms.  He has lost at least 40 lbs with Zepbound. Continue Zepbound weekly.  Hypertriglyceridemia with low HDL- Continue Lovaza 2 g twice daily.  I discussed risks of significant elevation in triglycerides with patient, including cardiovascular and pancreatitis could contribute to diabetes.  I will recheck labs when fasting, counseled patient not to have a large meal the night prior to the labs, and will ensure no diabetes.  Possible vitamin D deficiency- Continue vitamin D for now.  Dosing recommendations pending labs  History of H. Pylori- He does have history of H. pylori that he thinks was treated.  He does continue with some intermittent GI symptoms that last a couple days and occur about once monthly.  I discussed possible GERD versus gallbladder etiology.  We could consider H. pylori retesting with breath test, consideration of daily medication if needed or gallbladder workup if persistent intermittent symptoms.  Presyncopal episode, dizziness- I discussed my concerns with patient regarding his symptoms today.  He does have family history of premature coronary artery disease and has likely sleep apnea that could contribute to heart arrhythmia as well as swelling and significant varicosities left lower extremity with risk for DVT that could progress to PE.  These are all life-threatening potential causes for symptoms.  If he has any recurrence of symptoms, he is to call 911, as he is a fair distance from the nearest hospital.  He should have EKG with the paramedics and evaluation then either transport by private vehicle or ambulance to the ER for workup.  If no recurrence, we will still need to consider CTA chest pending venous duplex and consideration of cardiology evaluation after sleep testing.  Patient verbalized understanding and agreement with plan and  recommendations.  Family history CAD-He will likely need appointment with cardiology to evaluate and consideration of workup.  He will need sooner evaluation if he has any recurrence of dizziness or presyncopal episodes or develops any chest pain, shortness of air, palpitations, or other symptoms.  Possible HAYLEE, fatigue, snoring- He was unable to complete his home study due to increased anxiety once he put the device on.  I asked that he contact the sleep medicine office again and ensure they get a message to the provider to determine further recommendations for testing.  I counseled him at length regarding the imperative need to have sleep testing, treat sleep apnea if diagnosed, and the risks of untreated sleep apnea.  Varicose veins, leg swelling-left- It sounds like he may have a history of clot-superficial or deep in the past that he reports resolved.  However, he does have significant varicosities only of the left lower extremity and swelling of the left leg.  He has some warmth intermittent and pain.  I will refer for venous duplex left lower extremity.  If he is negative for DVT, he should use compression stockings.  We will consider vascular surgery if he would like to proceed with any procedures to treat  Multiple joint pain- Patient with intermittent bilateral shoulder elbow, and hand pain and has chronic knee pain and knee arthritis.  I will check autoimmune testing with his labs.  Consideration of further workup if needed.  Osteoarthritis- Continue follow-up with orthopedist as directed by them.    I spent 30 minutes caring for Hank Mayorga on this date of service. This time includes time spent by me in the following activities as necessary: preparing for the visit, reviewing tests, specialists records and previous visits, obtaining and/or reviewing a separately obtained history, performing a medically appropriate exam and/or evaluation, counseling and educating the patient, family, caregiver,  referring and/or communicating with other healthcare professionals, documenting information in the medical record, independently interpreting results and communicating that information with the patient, family, caregiver, and developing a medically appropriate treatment plan with consideration of other conditions, medications, and treatments.

## 2024-05-21 LAB
25(OH)D3+25(OH)D2 SERPL-MCNC: 73.5 NG/ML (ref 30–100)
ALBUMIN SERPL-MCNC: 4.6 G/DL (ref 4.1–5.1)
ALBUMIN/GLOB SERPL: 1.6 {RATIO} (ref 1.2–2.2)
ALP SERPL-CCNC: 80 IU/L (ref 44–121)
ALT SERPL-CCNC: 36 IU/L (ref 0–44)
APPEARANCE UR: CLEAR
AST SERPL-CCNC: 28 IU/L (ref 0–40)
BACTERIA #/AREA URNS HPF: NORMAL /[HPF]
BASOPHILS # BLD AUTO: 0.1 X10E3/UL (ref 0–0.2)
BASOPHILS NFR BLD AUTO: 1 %
BILIRUB SERPL-MCNC: 0.8 MG/DL (ref 0–1.2)
BILIRUB UR QL STRIP: NEGATIVE
BUN SERPL-MCNC: 17 MG/DL (ref 6–20)
BUN/CREAT SERPL: 19 (ref 9–20)
CALCIUM SERPL-MCNC: 10.1 MG/DL (ref 8.7–10.2)
CASTS URNS QL MICRO: NORMAL /LPF
CHLORIDE SERPL-SCNC: 101 MMOL/L (ref 96–106)
CHOLEST SERPL-MCNC: 124 MG/DL (ref 100–199)
CK SERPL-CCNC: 114 U/L (ref 49–439)
CO2 SERPL-SCNC: 21 MMOL/L (ref 20–29)
COLOR UR: YELLOW
CREAT SERPL-MCNC: 0.91 MG/DL (ref 0.76–1.27)
EGFRCR SERPLBLD CKD-EPI 2021: 113 ML/MIN/1.73
EOSINOPHIL # BLD AUTO: 0.1 X10E3/UL (ref 0–0.4)
EOSINOPHIL NFR BLD AUTO: 1 %
EPI CELLS #/AREA URNS HPF: NORMAL /HPF (ref 0–10)
ERYTHROCYTE [DISTWIDTH] IN BLOOD BY AUTOMATED COUNT: 13 % (ref 11.6–15.4)
FOLATE SERPL-MCNC: 11.2 NG/ML
FSH SERPL-ACNC: 1.5 MIU/ML (ref 1.5–12.4)
GLOBULIN SER CALC-MCNC: 2.8 G/DL (ref 1.5–4.5)
GLUCOSE SERPL-MCNC: 79 MG/DL (ref 70–99)
GLUCOSE UR QL STRIP: NEGATIVE
HBA1C MFR BLD: 5.4 % (ref 4.8–5.6)
HCT VFR BLD AUTO: 44.6 % (ref 37.5–51)
HDLC SERPL-MCNC: 29 MG/DL
HGB BLD-MCNC: 15 G/DL (ref 13–17.7)
HGB UR QL STRIP: NEGATIVE
IMM GRANULOCYTES # BLD AUTO: 0 X10E3/UL (ref 0–0.1)
IMM GRANULOCYTES NFR BLD AUTO: 0 %
KETONES UR QL STRIP: ABNORMAL
LDLC SERPL CALC-MCNC: 73 MG/DL (ref 0–99)
LDLC/HDLC SERPL: 2.5 RATIO (ref 0–3.6)
LEUKOCYTE ESTERASE UR QL STRIP: NEGATIVE
LH SERPL-ACNC: 5.2 MIU/ML (ref 1.7–8.6)
LYMPHOCYTES # BLD AUTO: 2 X10E3/UL (ref 0.7–3.1)
LYMPHOCYTES NFR BLD AUTO: 23 %
MCH RBC QN AUTO: 30.3 PG (ref 26.6–33)
MCHC RBC AUTO-ENTMCNC: 33.6 G/DL (ref 31.5–35.7)
MCV RBC AUTO: 90 FL (ref 79–97)
MICRO URNS: ABNORMAL
MICRO URNS: ABNORMAL
MONOCYTES # BLD AUTO: 0.5 X10E3/UL (ref 0.1–0.9)
MONOCYTES NFR BLD AUTO: 6 %
NEUTROPHILS # BLD AUTO: 6.2 X10E3/UL (ref 1.4–7)
NEUTROPHILS NFR BLD AUTO: 69 %
NITRITE UR QL STRIP: NEGATIVE
PH UR STRIP: 5.5 [PH] (ref 5–7.5)
PLATELET # BLD AUTO: 241 X10E3/UL (ref 150–450)
POTASSIUM SERPL-SCNC: 4.1 MMOL/L (ref 3.5–5.2)
PROT SERPL-MCNC: 7.4 G/DL (ref 6–8.5)
PROT UR QL STRIP: NEGATIVE
RBC # BLD AUTO: 4.95 X10E6/UL (ref 4.14–5.8)
RBC #/AREA URNS HPF: NORMAL /HPF (ref 0–2)
SODIUM SERPL-SCNC: 140 MMOL/L (ref 134–144)
SP GR UR STRIP: 1.03 (ref 1–1.03)
T3FREE SERPL-MCNC: 3.2 PG/ML (ref 2–4.4)
T4 FREE SERPL-MCNC: 1.24 NG/DL (ref 0.82–1.77)
TESTOST FREE SERPL-MCNC: 3.4 PG/ML (ref 8.7–25.1)
TESTOST SERPL-MCNC: 361 NG/DL (ref 264–916)
TRIGL SERPL-MCNC: 119 MG/DL (ref 0–149)
TSH SERPL DL<=0.005 MIU/L-ACNC: 1.55 UIU/ML (ref 0.45–4.5)
URATE SERPL-MCNC: 7 MG/DL (ref 3.8–8.4)
URINALYSIS REFLEX: ABNORMAL
UROBILINOGEN UR STRIP-MCNC: 0.2 MG/DL (ref 0.2–1)
VIT B12 SERPL-MCNC: 1132 PG/ML (ref 232–1245)
VLDLC SERPL CALC-MCNC: 22 MG/DL (ref 5–40)
WBC # BLD AUTO: 8.9 X10E3/UL (ref 3.4–10.8)
WBC #/AREA URNS HPF: NORMAL /HPF (ref 0–5)

## 2024-05-24 DIAGNOSIS — Z82.49 FAMILY HISTORY OF PREMATURE CAD: ICD-10-CM

## 2024-05-24 DIAGNOSIS — E78.2 MIXED HYPERLIPIDEMIA: ICD-10-CM

## 2024-05-24 DIAGNOSIS — E66.01 CLASS 3 SEVERE OBESITY WITH SERIOUS COMORBIDITY AND BODY MASS INDEX (BMI) OF 40.0 TO 44.9 IN ADULT, UNSPECIFIED OBESITY TYPE: ICD-10-CM

## 2024-05-24 DIAGNOSIS — R79.89 LOW TESTOSTERONE IN MALE: Primary | ICD-10-CM

## 2024-05-24 DIAGNOSIS — E55.9 VITAMIN D DEFICIENCY: ICD-10-CM

## 2024-05-24 DIAGNOSIS — R06.83 SNORING: ICD-10-CM

## 2024-05-28 ENCOUNTER — PATIENT MESSAGE (OUTPATIENT)
Dept: FAMILY MEDICINE CLINIC | Facility: CLINIC | Age: 34
End: 2024-05-28
Payer: COMMERCIAL

## 2024-05-28 DIAGNOSIS — E66.01 CLASS 3 SEVERE OBESITY WITH SERIOUS COMORBIDITY AND BODY MASS INDEX (BMI) OF 40.0 TO 44.9 IN ADULT, UNSPECIFIED OBESITY TYPE: ICD-10-CM

## 2024-05-28 DIAGNOSIS — E78.2 MIXED HYPERLIPIDEMIA: ICD-10-CM

## 2024-05-28 DIAGNOSIS — E78.6 ABNORMALLY LOW HIGH DENSITY LIPOPROTEIN (HDL) CHOLESTEROL WITH HYPERTRIGLYCERIDEMIA: ICD-10-CM

## 2024-05-28 DIAGNOSIS — E78.1 ABNORMALLY LOW HIGH DENSITY LIPOPROTEIN (HDL) CHOLESTEROL WITH HYPERTRIGLYCERIDEMIA: ICD-10-CM

## 2024-05-28 DIAGNOSIS — K21.9 GASTROESOPHAGEAL REFLUX DISEASE, UNSPECIFIED WHETHER ESOPHAGITIS PRESENT: ICD-10-CM

## 2024-05-28 DIAGNOSIS — Z82.49 FAMILY HISTORY OF PREMATURE CAD: ICD-10-CM

## 2024-05-29 DIAGNOSIS — E78.2 MIXED HYPERLIPIDEMIA: ICD-10-CM

## 2024-05-29 NOTE — TELEPHONE ENCOUNTER
From: Hank Mayorga  To: Sis Simental  Sent: 5/28/2024 4:28 PM EDT  Subject: Zepbound dosage    I have my last dose of 10mg today I am doing good on that dose and would like stay on it if possible

## 2024-05-30 RX ORDER — ATORVASTATIN CALCIUM 20 MG/1
20 TABLET, FILM COATED ORAL NIGHTLY
Qty: 90 TABLET | Refills: 0 | Status: SHIPPED | OUTPATIENT
Start: 2024-05-30

## 2024-05-30 RX ORDER — OMEGA-3-ACID ETHYL ESTERS 1 G/1
2 CAPSULE, LIQUID FILLED ORAL 2 TIMES DAILY
Qty: 360 CAPSULE | Refills: 0 | Status: SHIPPED | OUTPATIENT
Start: 2024-05-30

## 2024-07-22 ENCOUNTER — TELEPHONE (OUTPATIENT)
Dept: ENDOCRINOLOGY | Age: 34
End: 2024-07-22
Payer: COMMERCIAL

## 2024-07-22 NOTE — TELEPHONE ENCOUNTER
See prescription-this was filled last week.  Please check on prescription refill and ensure it was received.  You can call the pharmacy to ensure they received it and given verbal order if they report they have not received it.

## 2024-07-24 ENCOUNTER — TELEPHONE (OUTPATIENT)
Dept: FAMILY MEDICINE CLINIC | Facility: CLINIC | Age: 34
End: 2024-07-24
Payer: COMMERCIAL

## 2024-07-24 DIAGNOSIS — E78.2 MIXED HYPERLIPIDEMIA: ICD-10-CM

## 2024-07-24 DIAGNOSIS — K21.9 GASTROESOPHAGEAL REFLUX DISEASE, UNSPECIFIED WHETHER ESOPHAGITIS PRESENT: ICD-10-CM

## 2024-07-24 DIAGNOSIS — Z82.49 FAMILY HISTORY OF PREMATURE CAD: ICD-10-CM

## 2024-07-24 DIAGNOSIS — E66.01 CLASS 3 SEVERE OBESITY WITH SERIOUS COMORBIDITY AND BODY MASS INDEX (BMI) OF 40.0 TO 44.9 IN ADULT, UNSPECIFIED OBESITY TYPE: Primary | ICD-10-CM

## 2024-07-24 NOTE — TELEPHONE ENCOUNTER
I am not sure what this means.  Patient has been on Zepbound.  Maybe they mean he cannot continue the same dose for more than 1 month?  Please get better clarification about this and let me know.  I cannot make the insurance pay for what they do not cover.

## 2024-07-24 NOTE — TELEPHONE ENCOUNTER
Patients insurance will not cover more than 1 box of zepbound in 365 days. Please advise what you would like the patient to do.

## 2024-07-24 NOTE — TELEPHONE ENCOUNTER
The insurance company just said they will only cover one box per 365 days. They said the policy did not specify the dose.

## 2024-07-26 NOTE — TELEPHONE ENCOUNTER
Please have him contact his insurance. If there has been a formulary change or this is no longer covered, he will have to figure this out with his insurance.

## 2024-07-26 NOTE — TELEPHONE ENCOUNTER
Per the insurance company only 2 ML is covered  days. When I spoke with the insurance yesterday this can only be over ridden for medications deemed medically necessary and sine this is a weight loss drug it is not.

## 2024-07-26 NOTE — TELEPHONE ENCOUNTER
See media photo from patient, he did contact them and they said the same thing. After he sent that I called to see about the quantity PA and they stated that it is not a medication that is deemed medically necessary so they will not fill the medication any longer.    Left message for patient to inform him of this as well

## 2024-07-29 NOTE — TELEPHONE ENCOUNTER
That is possible, please send in the rx and I will let the patient know and check with the insurance

## 2024-07-29 NOTE — TELEPHONE ENCOUNTER
I sent the 15 mg to the pharmacy.  We can see if this goes through.  Otherwise, I do not have any other way to get this medication paid for.

## 2024-08-26 ENCOUNTER — OFFICE VISIT (OUTPATIENT)
Dept: FAMILY MEDICINE CLINIC | Facility: CLINIC | Age: 34
End: 2024-08-26
Payer: COMMERCIAL

## 2024-08-26 VITALS
WEIGHT: 256 LBS | HEIGHT: 70 IN | RESPIRATION RATE: 17 BRPM | DIASTOLIC BLOOD PRESSURE: 62 MMHG | TEMPERATURE: 97.8 F | SYSTOLIC BLOOD PRESSURE: 116 MMHG | OXYGEN SATURATION: 97 % | BODY MASS INDEX: 36.65 KG/M2 | HEART RATE: 66 BPM

## 2024-08-26 DIAGNOSIS — R06.83 SNORING: ICD-10-CM

## 2024-08-26 DIAGNOSIS — M25.522 BILATERAL ELBOW JOINT PAIN: ICD-10-CM

## 2024-08-26 DIAGNOSIS — M17.10 PATELLOFEMORAL ARTHRITIS: ICD-10-CM

## 2024-08-26 DIAGNOSIS — M79.641 BILATERAL HAND PAIN: ICD-10-CM

## 2024-08-26 DIAGNOSIS — G89.29 CHRONIC PAIN OF BOTH SHOULDERS: ICD-10-CM

## 2024-08-26 DIAGNOSIS — Z82.49 FAMILY HISTORY OF PREMATURE CAD: ICD-10-CM

## 2024-08-26 DIAGNOSIS — M25.521 BILATERAL ELBOW JOINT PAIN: ICD-10-CM

## 2024-08-26 DIAGNOSIS — E53.8 B12 DEFICIENCY: ICD-10-CM

## 2024-08-26 DIAGNOSIS — M25.561 CHRONIC PAIN OF BOTH KNEES: ICD-10-CM

## 2024-08-26 DIAGNOSIS — R42 DIZZINESS: ICD-10-CM

## 2024-08-26 DIAGNOSIS — K21.9 GASTROESOPHAGEAL REFLUX DISEASE, UNSPECIFIED WHETHER ESOPHAGITIS PRESENT: ICD-10-CM

## 2024-08-26 DIAGNOSIS — R53.83 OTHER FATIGUE: ICD-10-CM

## 2024-08-26 DIAGNOSIS — M25.512 CHRONIC PAIN OF BOTH SHOULDERS: ICD-10-CM

## 2024-08-26 DIAGNOSIS — G89.29 CHRONIC PAIN OF BOTH KNEES: ICD-10-CM

## 2024-08-26 DIAGNOSIS — E78.2 MIXED HYPERLIPIDEMIA: ICD-10-CM

## 2024-08-26 DIAGNOSIS — I83.92 VARICOSE VEINS OF LEFT LOWER LEG: ICD-10-CM

## 2024-08-26 DIAGNOSIS — R73.03 PREDIABETES: ICD-10-CM

## 2024-08-26 DIAGNOSIS — R55 NEAR SYNCOPE: ICD-10-CM

## 2024-08-26 DIAGNOSIS — M25.562 CHRONIC PAIN OF BOTH KNEES: ICD-10-CM

## 2024-08-26 DIAGNOSIS — M79.642 BILATERAL HAND PAIN: ICD-10-CM

## 2024-08-26 DIAGNOSIS — M79.89 LEFT LEG SWELLING: ICD-10-CM

## 2024-08-26 DIAGNOSIS — E66.01 CLASS 3 SEVERE OBESITY WITH SERIOUS COMORBIDITY AND BODY MASS INDEX (BMI) OF 40.0 TO 44.9 IN ADULT, UNSPECIFIED OBESITY TYPE: Primary | ICD-10-CM

## 2024-08-26 DIAGNOSIS — M25.50 MULTIPLE JOINT PAIN: ICD-10-CM

## 2024-08-26 DIAGNOSIS — M79.605 LEFT LEG PAIN: ICD-10-CM

## 2024-08-26 DIAGNOSIS — M25.511 CHRONIC PAIN OF BOTH SHOULDERS: ICD-10-CM

## 2024-08-26 DIAGNOSIS — R79.89 HIGH SERUM VITAMIN D: ICD-10-CM

## 2024-08-26 PROCEDURE — 99214 OFFICE O/P EST MOD 30 MIN: CPT | Performed by: PHYSICIAN ASSISTANT

## 2024-08-26 NOTE — PROGRESS NOTES
Subjective   Hank Mayorga is a 33 y.o. male who presents today in follow-up of obesity, hypertriglyceridemia with low HDL, vitamin D deficiency, H. pylori, presyncopal episode, family history of CAD, fatigue, snoring, HAYLEE, and specialists.  He is also having issues with losing weight, bilateral joint pain, and has significant varicose veins, swelling, and heat noted left lower extremity    History of Present Illness       Obesity, Weight- patient has continued on Zepbound- weight is down from 308 labs to 267 lbs and now 256 pounds.  He is down over 60 pounds total.  Patient reports he is out of medication.  Insurance was not covering more than 1 month supply of lower doses.  Just ran out of the medication.  No AE with medication. Has given 2nd dose of 10 mg. He has lost 45-50 lbs.   He has a hard time with weight. He has times that is ok and times that weight goes back up. Things do not change that much and weight up and down.   Hypertriglyceridemia with low HDL-started on Lovaza 8/2023.  He did eat biscuits and gravy prior to his labs 8/2023.  He reports it was 8 hours before his labs were performed but it was the same day because he wakes up very early.  History of TG up to 900s. No history of pancreatitis.   Prediabetes-on Zepbound  Elevated vitamin D-possible history of vitamin D deficiency- taking vitamin D 2000 IU daily.   He was taking vitamin D 5000 IU daily.   Patient with symmetric bilateral joint pain in his shoulders that improved with taking vitamin D 10,000 IU daily.  B12 deficiency-taking B12 1000 mcg daily.  Hypogonadism-patient with low testosterone and FSH and LH were not appropriately elevated.  Advised to follow-up with sleep medicine to have sleep study and ensure treatment of HAYLEE if positive and I referred to endocrinology.  Patient has not followed up with either specialist.    History of H. pylori-on no medications. Treated years ago. Still has stomach issues. No medication for treatment.  May have symptoms once monthly. Has to watch what he eats for a day or 2 then resolves.     Presyncopal episode-no episodes.   he reported he was in the shower and felt like he was going to pass out.  He caught himself and rested and felt okay.  He stayed home that day.  Family history of CAD and dad had multiple MI starting at the age of 40.  Family history CAD-dad with multiple MI starting at the age of 40. PGF with CAD as well. Has sibling- a fib.     Fatigue, snoring-for to sleep medicine 8/2023.  He had home sleep study but reports he started to panic and was unable to tolerate anything on his ears or the device on his finger.  Unable to complete home test.    Varicose veins, leg swelling-left- no change.   Left leg- varicose veins. He had posteriorly as a kid then worsening with increased varicose veins in left leg. Not painful but can get sore. In the winter, gets hot to touch and dry and itchy. Right leg is ok.   No US in 10 years. Had clot then checked and told was ok.    Multiple joint pain- knees not hurting as much.   Started having issues with bilateral shoulders- same time, same day without injury. He was ok and tried to ride it out. Tried Ibuprofen without improvement. He then started vitamin D and improved.   Left and right shoulder injury in the past.   Sometimes will hurt and sometimes it will not. Has tried to stop vitamin D and has had recurrence. Taking vitamin D 10,000 IU daily.   Bilateral knees hurts bilateral hands (worse in the winter), elbows- sometimes 1 hand or elbow will hurt worse but shoulders hurt together.   Osteoarthritis-knees, patellofemoral syndrome-following with orthopedic surgery with injections.    Patient's specialists:  Orthopedic surgery-Carlos Liu PA-C-last appointment 12/2023 for patellofemoral arthritis.  Injection given.  Sleep medicine-KIERRA Leroy-last appointment 11/2023 for hypersomnia, snoring, fatigue, witnessed episode of apnea, morbid obesity.   Ordered home sleep study.  Has not completed.    Previously established with KIERRA Thomas.  Due to scheduling, he is changing PCP.      The following portions of the patient's history were reviewed and updated as appropriate: allergies, current medications, past family history, past medical history, past social history, past surgical history and problem list.    Review of Systems    Objective   Vitals:    08/26/24 1509   BP: 116/62   Pulse: 66   Resp: 17   Temp: 97.8 °F (36.6 °C)   SpO2: 97%     Body mass index is 36.73 kg/m².     Physical Exam  Vitals and nursing note reviewed.   Constitutional:       Appearance: He is well-developed.   HENT:      Head: Normocephalic and atraumatic.      Right Ear: External ear normal.      Left Ear: External ear normal.   Eyes:      Conjunctiva/sclera: Conjunctivae normal.   Neck:      Thyroid: No thyroid mass or thyromegaly.      Vascular: No carotid bruit.      Trachea: No tracheal deviation.   Cardiovascular:      Rate and Rhythm: Normal rate and regular rhythm.      Heart sounds: Normal heart sounds.   Pulmonary:      Effort: Pulmonary effort is normal.      Breath sounds: Normal breath sounds.   Skin:     General: Skin is warm and dry.   Neurological:      Mental Status: He is alert and oriented to person, place, and time.      Gait: Gait normal.   Psychiatric:         Behavior: Behavior normal.         Thought Content: Thought content normal.         Assessment & Plan   Diagnoses and all orders for this visit:    1. Class 3 severe obesity with serious comorbidity and body mass index (BMI) of 40.0 to 44.9 in adult, unspecified obesity type (Primary)  -     Tirzepatide-Weight Management (ZEPBOUND) 15 MG/0.5ML solution auto-injector; Inject 0.5 mL under the skin into the appropriate area as directed 1 (One) Time Per Week.  Dispense: 2 mL; Refill: 2    2. Mixed hyperlipidemia  -     Comprehensive Metabolic Panel  -     CK  -     Lipid Panel With LDL / HDL Ratio  -      Tirzepatide-Weight Management (ZEPBOUND) 15 MG/0.5ML solution auto-injector; Inject 0.5 mL under the skin into the appropriate area as directed 1 (One) Time Per Week.  Dispense: 2 mL; Refill: 2    3. Prediabetes  -     Comprehensive Metabolic Panel  -     Hemoglobin A1c    4. High serum vitamin D  -     Comprehensive Metabolic Panel  -     Vitamin D,25-Hydroxy    5. B12 deficiency  -     CBC & Differential  -     Vitamin B12 & Folate    6. Near syncope    7. Dizziness    8. Family history of premature CAD  -     Tirzepatide-Weight Management (ZEPBOUND) 15 MG/0.5ML solution auto-injector; Inject 0.5 mL under the skin into the appropriate area as directed 1 (One) Time Per Week.  Dispense: 2 mL; Refill: 2    9. Other fatigue    10. Snoring    11. Varicose veins of left lower leg    12. Left leg swelling    13. Left leg pain    14. Patellofemoral arthritis    15. Chronic pain of both knees    16. Chronic pain of both shoulders    17. Bilateral hand pain    18. Bilateral elbow joint pain    19. Multiple joint pain    20. Gastroesophageal reflux disease, unspecified whether esophagitis present  -     Tirzepatide-Weight Management (ZEPBOUND) 15 MG/0.5ML solution auto-injector; Inject 0.5 mL under the skin into the appropriate area as directed 1 (One) Time Per Week.  Dispense: 2 mL; Refill: 2            Assessment and Plan  Patient will have fasting labs. Call if no results in 1 week. Stability of conditions, plan, follow up, and further recommendations pending labs.  Patient will likely need follow-up with me in 3-6 months pending lab results.    Morbid obesity, difficulty losing weight- Patient was having trouble keeping weight off and will lose and gain despite and significant changes in diet and exercise.  He has been advised to complete sleep study to ensure no sleep apnea that is contributing to his symptoms.  He has lost at least 60 lbs with Zepbound. Continue Zepbound 15 mg weekly.  We have had some issues with  coverage with his insurance.  I advised if he is unable to  the medication, he should contact his insurance to find out why they are only approving 1 month of the dosage.  He should find out what they recommend rather than just stopping the medication.  Patient should also ask if they have a weight  program that would help getting medication.  Hypertriglyceridemia with low HDL- Continue Lovaza 2 g twice daily.  I discussed risks of significant elevation in triglycerides with patient, including cardiovascular and pancreatitis could contribute to diabetes.  I will recheck labs when fasting, counseled patient not to have a large meal the night prior to the labs, and will ensure no diabetes.  Possible vitamin D deficiency- Continue vitamin D 2000 IU daily.  Dosing recommendations pending labs  B12 deficiency- Patient to continue B12 1000 mcg daily.  Further recommendations pending lab results.  Hypogonadism- He has been advised to see endocrinology and sleep medicine.  He is not seeing specialists at this time.  I will recheck medication with significant weight loss to see if he has had some improvement.  Will need to consider specialist if persistently abnormal.  History of H. Pylori- He does have history of H. pylori that he thinks was treated.  He does continue with some intermittent GI symptoms that last a couple days and occur about once monthly.  I discussed possible GERD versus gallbladder etiology.  We could consider H. pylori retesting with breath test, consideration of daily medication if needed or gallbladder workup if persistent intermittent symptoms.  Presyncopal episode, dizziness- I discussed my concerns with patient regarding his symptoms today.  He does have family history of premature coronary artery disease and has likely sleep apnea that could contribute to heart arrhythmia as well as swelling and significant varicosities left lower extremity with risk for DVT that could progress to PE.   These are all life-threatening potential causes for symptoms.  If he has any recurrence of symptoms, he is to call 911, as he is a fair distance from the nearest hospital.  He should have EKG with the paramedics and evaluation then either transport by private vehicle or ambulance to the ER for workup.  If no recurrence, we will still need to consider CTA chest pending venous duplex and consideration of cardiology evaluation after sleep testing.  Patient verbalized understanding and agreement with plan and recommendations.  Family history CAD-He will likely need appointment with cardiology to evaluate and consideration of workup.  He will need sooner evaluation if he has any recurrence of dizziness or presyncopal episodes or develops any chest pain, shortness of air, palpitations, or other symptoms.  Possible HAYLEE, fatigue, snoring- He was unable to complete his home study due to increased anxiety once he put the device on.  I asked that he contact the sleep medicine office again and ensure they get a message to the provider to determine further recommendations for testing.  I counseled him at length regarding the imperative need to have sleep testing, treat sleep apnea if diagnosed, and the risks of untreated sleep apnea.  Varicose veins, leg swelling-left- It sounds like he may have a history of clot-superficial or deep in the past that he reports resolved.  However, he does have significant varicosities only of the left lower extremity and swelling of the left leg.  He had some warmth intermittent and pain.  Negative venous duplex for DVT and noted superficial venous valvular incompetence left great saphenous and great saphenous veins.  Patient to use compression stockings.  We will consider vascular surgery if he would like to proceed with any procedures to treat  Multiple joint pain- Patient with intermittent bilateral shoulder elbow, and hand pain and has chronic knee pain and knee arthritis.  Negative  autoimmune testing 12/2023.  He has had some improvement with weight loss.  Consideration of further workup if needed.  Osteoarthritis- Continue follow-up with orthopedist as directed by them.    I spent 35 minutes caring for Hank Mayorga on this date of service. This time includes time spent by me in the following activities as necessary: preparing for the visit, reviewing tests, specialists records and previous visits, obtaining and/or reviewing a separately obtained history, performing a medically appropriate exam and/or evaluation, counseling and educating the patient, family, caregiver, referring and/or communicating with other healthcare professionals, documenting information in the medical record, independently interpreting results and communicating that information with the patient, family, caregiver, and developing a medically appropriate treatment plan with consideration of other conditions, medications, and treatments.

## 2024-08-30 RX ORDER — OMEGA-3-ACID ETHYL ESTERS 1 G/1
2 CAPSULE, LIQUID FILLED ORAL 2 TIMES DAILY
Qty: 360 CAPSULE | Refills: 0 | Status: SHIPPED | OUTPATIENT
Start: 2024-08-30

## 2024-09-19 DIAGNOSIS — E66.01 CLASS 3 SEVERE OBESITY WITH SERIOUS COMORBIDITY AND BODY MASS INDEX (BMI) OF 40.0 TO 44.9 IN ADULT, UNSPECIFIED OBESITY TYPE: ICD-10-CM

## 2024-09-19 DIAGNOSIS — E78.2 MIXED HYPERLIPIDEMIA: ICD-10-CM

## 2024-09-19 DIAGNOSIS — K21.9 GASTROESOPHAGEAL REFLUX DISEASE, UNSPECIFIED WHETHER ESOPHAGITIS PRESENT: ICD-10-CM

## 2024-09-19 DIAGNOSIS — Z82.49 FAMILY HISTORY OF PREMATURE CAD: ICD-10-CM

## 2024-09-25 RX ORDER — ATORVASTATIN CALCIUM 20 MG/1
20 TABLET, FILM COATED ORAL NIGHTLY
Qty: 90 TABLET | Refills: 0 | Status: SHIPPED | OUTPATIENT
Start: 2024-09-25

## 2024-10-08 LAB
25(OH)D3+25(OH)D2 SERPL-MCNC: 37.9 NG/ML (ref 30–100)
ALBUMIN SERPL-MCNC: 4.8 G/DL (ref 4.1–5.1)
ALP SERPL-CCNC: 82 IU/L (ref 44–121)
ALT SERPL-CCNC: 34 IU/L (ref 0–44)
AST SERPL-CCNC: 23 IU/L (ref 0–40)
BASOPHILS # BLD AUTO: 0.1 X10E3/UL (ref 0–0.2)
BASOPHILS NFR BLD AUTO: 1 %
BILIRUB SERPL-MCNC: 0.7 MG/DL (ref 0–1.2)
BUN SERPL-MCNC: 15 MG/DL (ref 6–20)
BUN/CREAT SERPL: 19 (ref 9–20)
CALCIUM SERPL-MCNC: 10.3 MG/DL (ref 8.7–10.2)
CHLORIDE SERPL-SCNC: 101 MMOL/L (ref 96–106)
CHOLEST SERPL-MCNC: 130 MG/DL (ref 100–199)
CK SERPL-CCNC: 108 U/L (ref 49–439)
CO2 SERPL-SCNC: 23 MMOL/L (ref 20–29)
CREAT SERPL-MCNC: 0.79 MG/DL (ref 0.76–1.27)
EGFRCR SERPLBLD CKD-EPI 2021: 118 ML/MIN/1.73
EOSINOPHIL # BLD AUTO: 0.1 X10E3/UL (ref 0–0.4)
EOSINOPHIL NFR BLD AUTO: 1 %
ERYTHROCYTE [DISTWIDTH] IN BLOOD BY AUTOMATED COUNT: 12.8 % (ref 11.6–15.4)
FOLATE SERPL-MCNC: 8.1 NG/ML
GLOBULIN SER CALC-MCNC: 2.9 G/DL (ref 1.5–4.5)
GLUCOSE SERPL-MCNC: 79 MG/DL (ref 70–99)
HBA1C MFR BLD: 5.3 % (ref 4.8–5.6)
HCT VFR BLD AUTO: 48.8 % (ref 37.5–51)
HDLC SERPL-MCNC: 29 MG/DL
HGB BLD-MCNC: 16 G/DL (ref 13–17.7)
IMM GRANULOCYTES # BLD AUTO: 0 X10E3/UL (ref 0–0.1)
IMM GRANULOCYTES NFR BLD AUTO: 0 %
LDLC SERPL CALC-MCNC: 78 MG/DL (ref 0–99)
LDLC/HDLC SERPL: 2.7 RATIO (ref 0–3.6)
LYMPHOCYTES # BLD AUTO: 2.4 X10E3/UL (ref 0.7–3.1)
LYMPHOCYTES NFR BLD AUTO: 28 %
MCH RBC QN AUTO: 30.1 PG (ref 26.6–33)
MCHC RBC AUTO-ENTMCNC: 32.8 G/DL (ref 31.5–35.7)
MCV RBC AUTO: 92 FL (ref 79–97)
MONOCYTES # BLD AUTO: 0.6 X10E3/UL (ref 0.1–0.9)
MONOCYTES NFR BLD AUTO: 7 %
NEUTROPHILS # BLD AUTO: 5.3 X10E3/UL (ref 1.4–7)
NEUTROPHILS NFR BLD AUTO: 63 %
PLATELET # BLD AUTO: 263 X10E3/UL (ref 150–450)
POTASSIUM SERPL-SCNC: 3.9 MMOL/L (ref 3.5–5.2)
PROT SERPL-MCNC: 7.7 G/DL (ref 6–8.5)
RBC # BLD AUTO: 5.32 X10E6/UL (ref 4.14–5.8)
SODIUM SERPL-SCNC: 141 MMOL/L (ref 134–144)
TRIGL SERPL-MCNC: 124 MG/DL (ref 0–149)
VIT B12 SERPL-MCNC: 1071 PG/ML (ref 232–1245)
VLDLC SERPL CALC-MCNC: 23 MG/DL (ref 5–40)
WBC # BLD AUTO: 8.5 X10E3/UL (ref 3.4–10.8)

## 2024-11-27 RX ORDER — OMEGA-3-ACID ETHYL ESTERS 1 G/1
2 CAPSULE, LIQUID FILLED ORAL 2 TIMES DAILY
Qty: 360 CAPSULE | Refills: 0 | Status: SHIPPED | OUTPATIENT
Start: 2024-11-27

## 2024-11-27 NOTE — TELEPHONE ENCOUNTER
See result note. Please call this patient to get him scheduled for follow up with me end of February or beginning of March.

## 2025-02-06 DIAGNOSIS — E78.2 MIXED HYPERLIPIDEMIA: ICD-10-CM

## 2025-02-06 DIAGNOSIS — Z82.49 FAMILY HISTORY OF PREMATURE CAD: ICD-10-CM

## 2025-02-06 DIAGNOSIS — K21.9 GASTROESOPHAGEAL REFLUX DISEASE, UNSPECIFIED WHETHER ESOPHAGITIS PRESENT: ICD-10-CM

## 2025-02-06 DIAGNOSIS — E66.813 CLASS 3 SEVERE OBESITY WITH SERIOUS COMORBIDITY AND BODY MASS INDEX (BMI) OF 40.0 TO 44.9 IN ADULT, UNSPECIFIED OBESITY TYPE: ICD-10-CM

## 2025-02-06 DIAGNOSIS — E66.01 CLASS 3 SEVERE OBESITY WITH SERIOUS COMORBIDITY AND BODY MASS INDEX (BMI) OF 40.0 TO 44.9 IN ADULT, UNSPECIFIED OBESITY TYPE: ICD-10-CM

## 2025-02-07 RX ORDER — TIRZEPATIDE 15 MG/.5ML
INJECTION, SOLUTION SUBCUTANEOUS
Qty: 4 ML | Refills: 0 | Status: SHIPPED | OUTPATIENT
Start: 2025-02-07

## 2025-02-07 NOTE — TELEPHONE ENCOUNTER
See result note. Please schedule him for follow up with me for the end of February or beginning of March

## 2025-03-03 DIAGNOSIS — E78.2 MIXED HYPERLIPIDEMIA: ICD-10-CM

## 2025-03-03 DIAGNOSIS — Z82.49 FAMILY HISTORY OF PREMATURE CAD: ICD-10-CM

## 2025-03-03 DIAGNOSIS — E66.813 CLASS 3 SEVERE OBESITY WITH SERIOUS COMORBIDITY AND BODY MASS INDEX (BMI) OF 40.0 TO 44.9 IN ADULT, UNSPECIFIED OBESITY TYPE: ICD-10-CM

## 2025-03-03 DIAGNOSIS — K21.9 GASTROESOPHAGEAL REFLUX DISEASE, UNSPECIFIED WHETHER ESOPHAGITIS PRESENT: ICD-10-CM

## 2025-03-03 DIAGNOSIS — E66.01 CLASS 3 SEVERE OBESITY WITH SERIOUS COMORBIDITY AND BODY MASS INDEX (BMI) OF 40.0 TO 44.9 IN ADULT, UNSPECIFIED OBESITY TYPE: ICD-10-CM

## 2025-03-03 RX ORDER — TIRZEPATIDE 15 MG/.5ML
INJECTION, SOLUTION SUBCUTANEOUS
Qty: 4 ML | Refills: 0 | Status: SHIPPED | OUTPATIENT
Start: 2025-03-03

## 2025-03-03 NOTE — TELEPHONE ENCOUNTER
Last Office Visit - This Dept  8/26/2024 Sis Simental PA    [id number] Patient is over due for appointment   HUB OKAY TO READ

## 2025-03-14 ENCOUNTER — OFFICE VISIT (OUTPATIENT)
Dept: FAMILY MEDICINE CLINIC | Facility: CLINIC | Age: 35
End: 2025-03-14
Payer: COMMERCIAL

## 2025-03-14 VITALS
RESPIRATION RATE: 18 BRPM | DIASTOLIC BLOOD PRESSURE: 70 MMHG | HEIGHT: 70 IN | OXYGEN SATURATION: 96 % | HEART RATE: 89 BPM | WEIGHT: 241 LBS | TEMPERATURE: 98.1 F | SYSTOLIC BLOOD PRESSURE: 112 MMHG | BODY MASS INDEX: 34.5 KG/M2

## 2025-03-14 DIAGNOSIS — K21.9 GASTROESOPHAGEAL REFLUX DISEASE, UNSPECIFIED WHETHER ESOPHAGITIS PRESENT: ICD-10-CM

## 2025-03-14 DIAGNOSIS — R55 NEAR SYNCOPE: ICD-10-CM

## 2025-03-14 DIAGNOSIS — R79.89 HIGH SERUM VITAMIN D: ICD-10-CM

## 2025-03-14 DIAGNOSIS — E53.8 B12 DEFICIENCY: ICD-10-CM

## 2025-03-14 DIAGNOSIS — Z82.49 FAMILY HISTORY OF PREMATURE CAD: ICD-10-CM

## 2025-03-14 DIAGNOSIS — M79.89 LEFT LEG SWELLING: ICD-10-CM

## 2025-03-14 DIAGNOSIS — I83.92 VARICOSE VEINS OF LEFT LOWER LEG: ICD-10-CM

## 2025-03-14 DIAGNOSIS — E66.813 CLASS 3 SEVERE OBESITY WITH SERIOUS COMORBIDITY AND BODY MASS INDEX (BMI) OF 40.0 TO 44.9 IN ADULT, UNSPECIFIED OBESITY TYPE: ICD-10-CM

## 2025-03-14 DIAGNOSIS — M25.512 CHRONIC PAIN OF BOTH SHOULDERS: ICD-10-CM

## 2025-03-14 DIAGNOSIS — Z86.19 HISTORY OF HELICOBACTER PYLORI INFECTION: ICD-10-CM

## 2025-03-14 DIAGNOSIS — M79.642 BILATERAL HAND PAIN: ICD-10-CM

## 2025-03-14 DIAGNOSIS — M79.641 BILATERAL HAND PAIN: ICD-10-CM

## 2025-03-14 DIAGNOSIS — M17.10 PATELLOFEMORAL ARTHRITIS: ICD-10-CM

## 2025-03-14 DIAGNOSIS — R42 DIZZINESS: ICD-10-CM

## 2025-03-14 DIAGNOSIS — G89.29 CHRONIC PAIN OF BOTH KNEES: ICD-10-CM

## 2025-03-14 DIAGNOSIS — R73.03 PREDIABETES: ICD-10-CM

## 2025-03-14 DIAGNOSIS — M25.562 CHRONIC PAIN OF BOTH KNEES: ICD-10-CM

## 2025-03-14 DIAGNOSIS — M25.522 BILATERAL ELBOW JOINT PAIN: ICD-10-CM

## 2025-03-14 DIAGNOSIS — R79.89 LOW TESTOSTERONE IN MALE: ICD-10-CM

## 2025-03-14 DIAGNOSIS — M25.521 BILATERAL ELBOW JOINT PAIN: ICD-10-CM

## 2025-03-14 DIAGNOSIS — M79.605 LEFT LEG PAIN: ICD-10-CM

## 2025-03-14 DIAGNOSIS — R06.83 SNORING: ICD-10-CM

## 2025-03-14 DIAGNOSIS — M25.511 CHRONIC PAIN OF BOTH SHOULDERS: ICD-10-CM

## 2025-03-14 DIAGNOSIS — G89.29 CHRONIC PAIN OF BOTH SHOULDERS: ICD-10-CM

## 2025-03-14 DIAGNOSIS — E66.01 CLASS 3 SEVERE OBESITY WITH SERIOUS COMORBIDITY AND BODY MASS INDEX (BMI) OF 40.0 TO 44.9 IN ADULT, UNSPECIFIED OBESITY TYPE: ICD-10-CM

## 2025-03-14 DIAGNOSIS — M25.50 MULTIPLE JOINT PAIN: ICD-10-CM

## 2025-03-14 DIAGNOSIS — Z00.00 ROUTINE ADULT HEALTH MAINTENANCE: Primary | ICD-10-CM

## 2025-03-14 DIAGNOSIS — M25.561 CHRONIC PAIN OF BOTH KNEES: ICD-10-CM

## 2025-03-14 DIAGNOSIS — E55.9 VITAMIN D DEFICIENCY: ICD-10-CM

## 2025-03-14 DIAGNOSIS — E78.2 MIXED HYPERLIPIDEMIA: ICD-10-CM

## 2025-03-14 DIAGNOSIS — R53.83 OTHER FATIGUE: ICD-10-CM

## 2025-03-14 PROBLEM — M67.813 TENDINOSIS OF RIGHT ROTATOR CUFF: Status: ACTIVE | Noted: 2024-07-25

## 2025-03-14 PROBLEM — M75.52 SUBACROMIAL BURSITIS OF LEFT SHOULDER JOINT: Status: ACTIVE | Noted: 2024-07-25

## 2025-03-14 PROBLEM — S46.811A PARTIAL TEAR OF RIGHT SUBSCAPULARIS TENDON: Status: ACTIVE | Noted: 2024-07-25

## 2025-03-14 PROBLEM — S49.91XA INJURY OF RIGHT SHOULDER: Status: ACTIVE | Noted: 2024-06-21

## 2025-03-14 RX ORDER — ATORVASTATIN CALCIUM 20 MG/1
20 TABLET, FILM COATED ORAL NIGHTLY
Qty: 90 TABLET | Refills: 0 | Status: SHIPPED | OUTPATIENT
Start: 2025-03-14

## 2025-03-14 RX ORDER — TIRZEPATIDE 15 MG/.5ML
15 INJECTION, SOLUTION SUBCUTANEOUS WEEKLY
Qty: 6 ML | Refills: 1 | Status: SHIPPED | OUTPATIENT
Start: 2025-03-14

## 2025-03-14 NOTE — PROGRESS NOTES
Subjective   Hank Mayorga is a 34 y.o. male who presents today in follow-up of obesity, hypertriglyceridemia with low HDL, vitamin D deficiency, H. pylori, presyncopal episode, family history of CAD, fatigue, snoring, HAYLEE, and specialists.  He is also having issues with losing weight, bilateral joint pain, and has significant varicose veins, swelling, and heat noted left lower extremity    History of Present Illness       Obesity, Weight- patient has lost about 70 lbs total. Weight at home 235-238 lbs.   Patient has continued on Zepbound- weight is down from 308 labs to 267 lbs and now 256 pounds.  He is down over 60 pounds total.  Patient reports he is out of medication.  Insurance was not covering more than 1 month supply of lower doses.  Just ran out of the medication.  No AE with medication. Has given 2nd dose of 10 mg. He has lost 45-50 lbs.   He has a hard time with weight. He has times that is ok and times that weight goes back up. Things do not change that much and weight up and down.   Hypertriglyceridemia with low HDL- taking Lipitor 20 mg and Lovaza 2 G once daily instead of twice daily  He was started on Lovaza 8/2023.  He did eat biscuits and gravy prior to his labs 8/2023.  He reports it was 8 hours before his labs were performed but it was the same day because he wakes up very early.  History of TG up to 900s. No history of pancreatitis.   Prediabetes-on Zepbound  Elevated vitamin D-possible history of vitamin D deficiency- taking vitamin D 2000 IU daily.   He was taking vitamin D 5000 IU daily.   Patient with symmetric bilateral joint pain in his shoulders that improved with taking vitamin D 10,000 IU daily.  B12 deficiency-taking B12 1000 mcg daily.  Hypogonadism- urology cancelled twice and is no longer on supplement   Patient with low testosterone and FSH and LH were not appropriately elevated.  Advised to follow-up with sleep medicine to have sleep study and ensure treatment of HAYLEE if positive  and I referred to endocrinology.  Patient has not followed up with either specialist.    History of H. pylori, GERD-on no medications. Treated years ago. Still has stomach issues. No medication for treatment. May have symptoms once monthly. Has to watch what he eats for a day or 2 then resolves.     Presyncopal episode-no episodes.   he reported he was in the shower and felt like he was going to pass out.  He caught himself and rested and felt okay.  He stayed home that day.  Family history of CAD and dad had multiple MI starting at the age of 40.  Family history CAD-dad with multiple MI starting at the age of 40. PGF with CAD as well. Has sibling- a fib.     Fatigue, snoring-for to sleep medicine 8/2023.  He had home sleep study but reports he started to panic and was unable to tolerate anything on his ears or the device on his finger.  Unable to complete home test.    Varicose veins, leg swelling-left- no change.   Left leg- varicose veins. He had posteriorly as a kid then worsening with increased varicose veins in left leg. Not painful but can get sore. In the winter, gets hot to touch and dry and itchy. Right leg is ok.   No US in 10 years. Had clot then checked and told was ok.    Multiple joint pain- knees not hurting as much.   Started having issues with bilateral shoulders- same time, same day without injury. He was ok and tried to ride it out. Tried Ibuprofen without improvement. He then started vitamin D and improved.   Left and right shoulder injury in the past.   Sometimes will hurt and sometimes it will not. Has tried to stop vitamin D and has had recurrence. Taking vitamin D 10,000 IU daily.   Bilateral knees hurts bilateral hands (worse in the winter), elbows- sometimes 1 hand or elbow will hurt worse but shoulders hurt together.   Osteoarthritis-knees, patellofemoral syndrome-following with orthopedic surgery with injections.    Patient's specialists:  Orthopedic surgery-Carlos Liu PA-C-last  appointment 12/2023 for patellofemoral arthritis.  Injection given.  Sleep medicine-KIERRA Leroy-last appointment 11/2023 for hypersomnia, snoring, fatigue, witnessed episode of apnea, morbid obesity.  Ordered home sleep study.  Has not completed.    Previously established with KIERRA Thomas.  Due to scheduling, he is changing PCP.      The following portions of the patient's history were reviewed and updated as appropriate: allergies, current medications, past family history, past medical history, past social history, past surgical history and problem list.    Review of Systems    Objective   Vitals:    03/14/25 1435   BP: 112/70   Pulse: 89   Resp: 18   Temp: 98.1 °F (36.7 °C)   SpO2: 96%     Body mass index is 34.58 kg/m².     Physical Exam  Vitals and nursing note reviewed.   Constitutional:       Appearance: He is well-developed.   HENT:      Head: Normocephalic and atraumatic.      Right Ear: External ear normal.      Left Ear: External ear normal.   Eyes:      Conjunctiva/sclera: Conjunctivae normal.   Neck:      Thyroid: No thyroid mass or thyromegaly.      Vascular: No carotid bruit.      Trachea: No tracheal deviation.   Cardiovascular:      Rate and Rhythm: Normal rate and regular rhythm.      Heart sounds: Normal heart sounds.   Pulmonary:      Effort: Pulmonary effort is normal.      Breath sounds: Normal breath sounds.   Skin:     General: Skin is warm and dry.   Neurological:      Mental Status: He is alert and oriented to person, place, and time.      Gait: Gait normal.   Psychiatric:         Behavior: Behavior normal.         Thought Content: Thought content normal.         Assessment & Plan   Diagnoses and all orders for this visit:    1. Routine adult health maintenance (Primary)    2. Class 3 severe obesity with serious comorbidity and body mass index (BMI) of 40.0 to 44.9 in adult, unspecified obesity type  -     CBC & Differential  -     Comprehensive Metabolic Panel  -      Hemoglobin A1c  -     CK  -     Lipid Panel With LDL / HDL Ratio  -     Vitamin B12 & Folate  -     Vitamin D,25-Hydroxy  -     TSH  -     Testosterone, Free, Total  -     Follicle Stimulating Hormone  -     Luteinizing Hormone  -     Prolactin  -     Urinalysis With Culture If Indicated -  -     Tirzepatide-Weight Management (Zepbound) 15 MG/0.5ML solution auto-injector; Inject 0.5 mL under the skin into the appropriate area as directed 1 (One) Time Per Week.  Dispense: 6 mL; Refill: 1    3. Mixed hyperlipidemia  -     atorvastatin (Lipitor) 20 MG tablet; Take 1 tablet by mouth Every Night.  Dispense: 90 tablet; Refill: 0  -     Comprehensive Metabolic Panel  -     CK  -     Lipid Panel With LDL / HDL Ratio  -     Tirzepatide-Weight Management (Zepbound) 15 MG/0.5ML solution auto-injector; Inject 0.5 mL under the skin into the appropriate area as directed 1 (One) Time Per Week.  Dispense: 6 mL; Refill: 1    4. Family history of premature CAD  -     Comprehensive Metabolic Panel  -     Hemoglobin A1c  -     Lipid Panel With LDL / HDL Ratio  -     Tirzepatide-Weight Management (Zepbound) 15 MG/0.5ML solution auto-injector; Inject 0.5 mL under the skin into the appropriate area as directed 1 (One) Time Per Week.  Dispense: 6 mL; Refill: 1  -     Ambulatory Referral to Cardiology    5. Prediabetes  -     Comprehensive Metabolic Panel  -     Hemoglobin A1c  -     TSH  -     Urinalysis With Culture If Indicated -    6. Vitamin D deficiency  -     Comprehensive Metabolic Panel  -     Vitamin D,25-Hydroxy    7. High serum vitamin D  -     Comprehensive Metabolic Panel  -     Vitamin D,25-Hydroxy    8. B12 deficiency  -     CBC & Differential  -     Vitamin B12 & Folate    9. Low testosterone in male  -     CBC & Differential  -     Comprehensive Metabolic Panel  -     TSH  -     Testosterone, Free, Total  -     Follicle Stimulating Hormone  -     Luteinizing Hormone  -     Prolactin    10. Gastroesophageal reflux disease,  unspecified whether esophagitis present  -     Tirzepatide-Weight Management (Zepbound) 15 MG/0.5ML solution auto-injector; Inject 0.5 mL under the skin into the appropriate area as directed 1 (One) Time Per Week.  Dispense: 6 mL; Refill: 1    11. History of Helicobacter pylori infection    12. Near syncope    13. Dizziness    14. Other fatigue    15. Snoring    16. Varicose veins of left lower leg    17. Left leg swelling    18. Left leg pain    19. Patellofemoral arthritis    20. Chronic pain of both knees    21. Chronic pain of both shoulders    22. Bilateral hand pain    23. Bilateral elbow joint pain    24. Multiple joint pain    Other orders  -     Microscopic Examination -            Assessment and Plan  Patient will have fasting labs. Call if no results in 1 week. Stability of conditions, plan, follow up, and further recommendations pending labs.  Patient will likely need follow-up with me in 3-6 months pending lab results.    Morbid obesity, difficulty losing weight- Patient was having trouble keeping weight off and will lose and gain despite and significant changes in diet and exercise.  He has been advised to complete sleep study to ensure no sleep apnea that is contributing to his symptoms.  He has lost at least 60 lbs with Zepbound. Continue Zepbound 15 mg weekly.  We have had some issues with coverage with his insurance.  I advised if he is unable to  the medication, he should contact his insurance to find out why they are only approving 1 month of the dosage.  He should find out what they recommend rather than just stopping the medication.  Patient should also ask if they have a weight  program that would help getting medication.  Hypertriglyceridemia with low HDL- Continue Lovaza 2 g twice daily.  I discussed risks of significant elevation in triglycerides with patient, including cardiovascular and pancreatitis could contribute to diabetes.  I will recheck labs when fasting, counseled  patient not to have a large meal the night prior to the labs, and will ensure no diabetes.  Possible vitamin D deficiency- Continue vitamin D 2000 IU daily.  Dosing recommendations pending labs  B12 deficiency- Patient to continue B12 1000 mcg daily.  Further recommendations pending lab results.  Hypogonadism- He has been advised to see endocrinology and sleep medicine.  He is not seeing specialists at this time.  I will recheck medication with significant weight loss to see if he has had some improvement.  Will need to consider specialist if persistently abnormal.  History of H. Pylori- He does have history of H. pylori that he thinks was treated.  He does continue with some intermittent GI symptoms that last a couple days and occur about once monthly.  I discussed possible GERD versus gallbladder etiology.  We could consider H. pylori retesting with breath test, consideration of daily medication if needed or gallbladder workup if persistent intermittent symptoms.  Presyncopal episode, dizziness- I discussed my concerns with patient regarding his symptoms today.  He does have family history of premature coronary artery disease and has likely sleep apnea that could contribute to heart arrhythmia as well as swelling and significant varicosities left lower extremity with risk for DVT that could progress to PE.  These are all life-threatening potential causes for symptoms.  If he has any recurrence of symptoms, he is to call 911, as he is a fair distance from the nearest hospital.  He should have EKG with the paramedics and evaluation then either transport by private vehicle or ambulance to the ER for workup.  If no recurrence, we will still need to consider CTA chest pending venous duplex and consideration of cardiology evaluation after sleep testing.  Patient verbalized understanding and agreement with plan and recommendations.  Family history CAD-He will likely need appointment with cardiology to evaluate and  consideration of workup.  He will need sooner evaluation if he has any recurrence of dizziness or presyncopal episodes or develops any chest pain, shortness of air, palpitations, or other symptoms.  Possible HAYLEE, fatigue, snoring- He was unable to complete his home study due to increased anxiety once he put the device on.  I asked that he contact the sleep medicine office again and ensure they get a message to the provider to determine further recommendations for testing.  I counseled him at length regarding the imperative need to have sleep testing, treat sleep apnea if diagnosed, and the risks of untreated sleep apnea.  Varicose veins, leg swelling-left- It sounds like he may have a history of clot-superficial or deep in the past that he reports resolved.  However, he does have significant varicosities only of the left lower extremity and swelling of the left leg.  He had some warmth intermittent and pain.  Negative venous duplex for DVT and noted superficial venous valvular incompetence left great saphenous and great saphenous veins.  Patient to use compression stockings.  We will consider vascular surgery if he would like to proceed with any procedures to treat  Multiple joint pain- Patient with intermittent bilateral shoulder elbow, and hand pain and has chronic knee pain and knee arthritis.  Negative autoimmune testing 12/2023.  He has had some improvement with weight loss.  Consideration of further workup if needed.  Osteoarthritis- Continue follow-up with orthopedist as directed by them.    I spent 35 minutes caring for Hank Mayorga on this date of service. This time includes time spent by me in the following activities as necessary: preparing for the visit, reviewing tests, specialists records and previous visits, obtaining and/or reviewing a separately obtained history, performing a medically appropriate exam and/or evaluation, counseling and educating the patient, family, caregiver, referring and/or  communicating with other healthcare professionals, documenting information in the medical record, independently interpreting results and communicating that information with the patient, family, caregiver, and developing a medically appropriate treatment plan with consideration of other conditions, medications, and treatments.

## 2025-03-14 NOTE — PROGRESS NOTES
Subjective   Hank Mayorga is a 34 y.o. male who presents today for CPE.     History of Present Illness     Last colonoscopy-has not had.  No family history of colon cancer.  Last Tdap-1/2021  Prevnar-  Pneumovax-  Hepatitis A-  Hepatitis B-8/19/1999, 9/19/1999, 2/2020  Last flu shot-2018  Shingrix- had varicella as a child.  Covid 19-  MMR-8/19/1999.      Past Medical History:   Diagnosis Date    Knee swelling 3 yrs ago    Not sure on the exact date     No past surgical history on file.  Social History     Socioeconomic History    Marital status:    Tobacco Use    Smoking status: Never     Passive exposure: Never    Smokeless tobacco: Never   Vaping Use    Vaping status: Never Used   Substance and Sexual Activity    Alcohol use: No    Drug use: Never    Sexual activity: Yes     Partners: Female      Family History   Problem Relation Age of Onset    Cancer Other         SKIN    Diabetes Other     Heart disease Other     Hypertension Other         The following portions of the patient's history were reviewed and updated as appropriate: allergies, current medications, past family history, past medical history, past social history, past surgical history and problem list.    Review of Systems    Objective   Vitals:    03/14/25 1435   BP: 112/70   Pulse: 89   Resp: 18   Temp: 98.1 °F (36.7 °C)   SpO2: 96%     Body mass index is 34.58 kg/m².    Physical Exam    Assessment & Plan   Diagnoses and all orders for this visit:    1. Mixed hyperlipidemia        Assessment and Plan      I spent 35 minutes caring for Hank Mayorga on this date of service. This time includes time spent by me in the following activities as necessary: preparing for the visit, reviewing tests, specialists records and previous visits, obtaining and/or reviewing a separately obtained history, performing a medically appropriate exam and/or evaluation, counseling and educating the patient, family, caregiver, referring and/or communicating with  other healthcare professionals, documenting information in the medical record, independently interpreting results and communicating that information with the patient, family, caregiver, and developing a medically appropriate treatment plan with consideration of other conditions, medications, and treatments.

## 2025-03-22 LAB
25(OH)D3+25(OH)D2 SERPL-MCNC: 35 NG/ML (ref 30–100)
ALBUMIN SERPL-MCNC: 4.7 G/DL (ref 4.1–5.1)
ALP SERPL-CCNC: 100 IU/L (ref 44–121)
ALT SERPL-CCNC: 28 IU/L (ref 0–44)
APPEARANCE UR: CLEAR
AST SERPL-CCNC: 24 IU/L (ref 0–40)
BACTERIA #/AREA URNS HPF: NORMAL /[HPF]
BASOPHILS # BLD AUTO: 0.1 X10E3/UL (ref 0–0.2)
BASOPHILS NFR BLD AUTO: 1 %
BILIRUB SERPL-MCNC: 0.9 MG/DL (ref 0–1.2)
BILIRUB UR QL STRIP: NEGATIVE
BUN SERPL-MCNC: 12 MG/DL (ref 6–20)
BUN/CREAT SERPL: 11 (ref 9–20)
CALCIUM SERPL-MCNC: 10.4 MG/DL (ref 8.7–10.2)
CASTS URNS QL MICRO: NORMAL /LPF
CHLORIDE SERPL-SCNC: 103 MMOL/L (ref 96–106)
CHOLEST SERPL-MCNC: 137 MG/DL (ref 100–199)
CK SERPL-CCNC: 128 U/L (ref 49–439)
CO2 SERPL-SCNC: 25 MMOL/L (ref 20–29)
COLOR UR: YELLOW
CREAT SERPL-MCNC: 1.06 MG/DL (ref 0.76–1.27)
EGFRCR SERPLBLD CKD-EPI 2021: 93 ML/MIN/1.73
EOSINOPHIL # BLD AUTO: 0.1 X10E3/UL (ref 0–0.4)
EOSINOPHIL NFR BLD AUTO: 1 %
EPI CELLS #/AREA URNS HPF: NORMAL /HPF (ref 0–10)
ERYTHROCYTE [DISTWIDTH] IN BLOOD BY AUTOMATED COUNT: 13.1 % (ref 11.6–15.4)
FOLATE SERPL-MCNC: 11.5 NG/ML
FSH SERPL-ACNC: 1.9 MIU/ML (ref 1.5–12.4)
GLOBULIN SER CALC-MCNC: 2.9 G/DL (ref 1.5–4.5)
GLUCOSE SERPL-MCNC: 85 MG/DL (ref 70–99)
GLUCOSE UR QL STRIP: NEGATIVE
HBA1C MFR BLD: 5.3 % (ref 4.8–5.6)
HCT VFR BLD AUTO: 49.7 % (ref 37.5–51)
HDLC SERPL-MCNC: 30 MG/DL
HGB BLD-MCNC: 16.5 G/DL (ref 13–17.7)
HGB UR QL STRIP: NEGATIVE
IMM GRANULOCYTES # BLD AUTO: 0 X10E3/UL (ref 0–0.1)
IMM GRANULOCYTES NFR BLD AUTO: 0 %
KETONES UR QL STRIP: NEGATIVE
LDLC SERPL CALC-MCNC: 82 MG/DL (ref 0–99)
LDLC/HDLC SERPL: 2.7 RATIO (ref 0–3.6)
LEUKOCYTE ESTERASE UR QL STRIP: NEGATIVE
LH SERPL-ACNC: 6.5 MIU/ML (ref 1.7–8.6)
LYMPHOCYTES # BLD AUTO: 2.2 X10E3/UL (ref 0.7–3.1)
LYMPHOCYTES NFR BLD AUTO: 26 %
MCH RBC QN AUTO: 30 PG (ref 26.6–33)
MCHC RBC AUTO-ENTMCNC: 33.2 G/DL (ref 31.5–35.7)
MCV RBC AUTO: 90 FL (ref 79–97)
MICRO URNS: NORMAL
MICRO URNS: NORMAL
MONOCYTES # BLD AUTO: 0.7 X10E3/UL (ref 0.1–0.9)
MONOCYTES NFR BLD AUTO: 8 %
NEUTROPHILS # BLD AUTO: 5.2 X10E3/UL (ref 1.4–7)
NEUTROPHILS NFR BLD AUTO: 64 %
NITRITE UR QL STRIP: NEGATIVE
PH UR STRIP: 7.5 [PH] (ref 5–7.5)
PLATELET # BLD AUTO: 264 X10E3/UL (ref 150–450)
POTASSIUM SERPL-SCNC: 4.4 MMOL/L (ref 3.5–5.2)
PROLACTIN SERPL-MCNC: 8.7 NG/ML (ref 3.9–22.7)
PROT SERPL-MCNC: 7.6 G/DL (ref 6–8.5)
PROT UR QL STRIP: NEGATIVE
RBC # BLD AUTO: 5.5 X10E6/UL (ref 4.14–5.8)
RBC #/AREA URNS HPF: NORMAL /HPF (ref 0–2)
SODIUM SERPL-SCNC: 141 MMOL/L (ref 134–144)
SP GR UR STRIP: 1.02 (ref 1–1.03)
TESTOST FREE SERPL-MCNC: 6 PG/ML (ref 8.7–25.1)
TESTOST SERPL-MCNC: 561 NG/DL (ref 264–916)
TRIGL SERPL-MCNC: 142 MG/DL (ref 0–149)
TSH SERPL DL<=0.005 MIU/L-ACNC: 1.58 UIU/ML (ref 0.45–4.5)
URINALYSIS REFLEX: NORMAL
UROBILINOGEN UR STRIP-MCNC: 0.2 MG/DL (ref 0.2–1)
VIT B12 SERPL-MCNC: 1024 PG/ML (ref 232–1245)
VLDLC SERPL CALC-MCNC: 25 MG/DL (ref 5–40)
WBC # BLD AUTO: 8.2 X10E3/UL (ref 3.4–10.8)
WBC #/AREA URNS HPF: NORMAL /HPF (ref 0–5)

## 2025-03-26 ENCOUNTER — PATIENT MESSAGE (OUTPATIENT)
Dept: FAMILY MEDICINE CLINIC | Facility: CLINIC | Age: 35
End: 2025-03-26
Payer: COMMERCIAL

## 2025-03-31 ENCOUNTER — OFFICE VISIT (OUTPATIENT)
Dept: CARDIOLOGY | Facility: CLINIC | Age: 35
End: 2025-03-31
Payer: COMMERCIAL

## 2025-03-31 VITALS
SYSTOLIC BLOOD PRESSURE: 116 MMHG | HEART RATE: 55 BPM | HEIGHT: 70 IN | BODY MASS INDEX: 34.79 KG/M2 | DIASTOLIC BLOOD PRESSURE: 80 MMHG | OXYGEN SATURATION: 99 % | RESPIRATION RATE: 18 BRPM | WEIGHT: 243 LBS

## 2025-03-31 DIAGNOSIS — E78.2 MIXED HYPERLIPIDEMIA: ICD-10-CM

## 2025-03-31 DIAGNOSIS — Z82.49 FAMILY HISTORY OF PREMATURE CAD: ICD-10-CM

## 2025-03-31 DIAGNOSIS — E78.1 ABNORMALLY LOW HIGH DENSITY LIPOPROTEIN (HDL) CHOLESTEROL WITH HYPERTRIGLYCERIDEMIA: Primary | ICD-10-CM

## 2025-03-31 DIAGNOSIS — E78.6 ABNORMALLY LOW HIGH DENSITY LIPOPROTEIN (HDL) CHOLESTEROL WITH HYPERTRIGLYCERIDEMIA: Primary | ICD-10-CM

## 2025-03-31 PROCEDURE — 93000 ELECTROCARDIOGRAM COMPLETE: CPT | Performed by: INTERNAL MEDICINE

## 2025-03-31 PROCEDURE — 99204 OFFICE O/P NEW MOD 45 MIN: CPT | Performed by: INTERNAL MEDICINE

## 2025-03-31 NOTE — PROGRESS NOTES
Subjective:     Encounter Date:03/31/25      Patient ID: Hank Mayorga is a 34 y.o. male.    Chief Complaint:  History of Present Illness    Dear Sis,    I had the pleasure of seeing this patient in the office today for initial evaluation and consultation.  I appreciate that you sent him in to see us.  They come in today to be seen for assessment of cardiac risk.    Patient has a history of premature CAD, obesity, hyperlipidemia and comes in for assessment of possible coronary disease given his increased cardiac risk.    He denies any chest pain, pressure, tightness, squeezing, or heartburn.  He has not experienced any feeling of palpitations, tachycardia or heart racing and no presyncope or syncope.  There has not been any problems with dizziness or lightheadedness.  There has not been any orthopnea or PND, and no problems with lower extremity edema.  He denies any shortness of breath at rest or with activity and has not had any wheezing.  He has not had any problems with unexplained nausea or vomiting. He has continued to perform daily activities of living without any specific problem or change in the level of activity.  He has not been recently hospitalized for any reason.    No known cardiac history.  Specifically,  no history of coronary artery disease, congestive heart failure, rheumatic fever, rheumatic heart disease, or congenital heart disease.    No routine exercise habits at this point.  He has been working on weight loss and is lost over 80 pounds on the Zepbound.    The following portions of the patient's history were reviewed and updated as appropriate: allergies, current medications, past family history, past medical history, past social history, past surgical history and problem list.      ECG 12 Lead    Date/Time: 3/31/2025 10:54 AM  Performed by: Alireza House III, MD    Authorized by: Alireza House III, MD  Comparison: compared with previous ECG   Similar to previous ECG  Rhythm: sinus  "rhythm  Rate: normal  Conduction: conduction normal  ST Segments: ST segments normal  T Waves: T waves normal  QRS axis: normal  Other: no other findings    Clinical impression: normal ECG             Objective:     Vitals:    03/31/25 1006   BP: 116/80   Pulse: 55   Resp: 18   SpO2: 99%   Weight: 110 kg (243 lb)   Height: 177.8 cm (70\")     Body mass index is 34.87 kg/m².      Vitals reviewed.   Constitutional:       General: Not in acute distress.     Appearance: Well-developed. Not diaphoretic.   Eyes:      General:         Right eye: No discharge.         Left eye: No discharge.      Conjunctiva/sclera: Conjunctivae normal.   HENT:      Head: Normocephalic and atraumatic.      Nose: Nose normal.   Neck:      Thyroid: No thyromegaly.      Trachea: No tracheal deviation.   Pulmonary:      Effort: Pulmonary effort is normal. No respiratory distress.      Breath sounds: Normal breath sounds. No stridor.   Chest:      Chest wall: Not tender to palpatation.   Cardiovascular:      Normal rate. Regular rhythm.      Murmurs: There is no murmur.      . No S3 gallop. No click. No rub.   Pulses:     Intact distal pulses.   Edema:     Peripheral edema absent.   Abdominal:      General: Bowel sounds are normal. There is no distension.      Palpations: Abdomen is soft. There is no abdominal mass.   Musculoskeletal: Normal range of motion.         General: No tenderness or deformity.      Cervical back: Normal range of motion and neck supple. Skin:     General: Skin is warm and dry.      Findings: No erythema or rash.   Neurological:      Mental Status: Alert.   Psychiatric:         Attention and Perception: Attention normal.         Data and records reviewed:     Lab Results   Component Value Date    GLUCOSE 85 03/14/2025    BUN 12 03/14/2025    CREATININE 1.06 03/14/2025     03/14/2025    K 4.4 03/14/2025     03/14/2025    CALCIUM 10.4 (H) 03/14/2025    PROTEINTOT 7.6 03/14/2025    ALBUMIN 4.7 03/14/2025    ALT 28 " "03/14/2025    AST 24 03/14/2025    ALKPHOS 100 03/14/2025    BILITOT 0.9 03/14/2025    GLOB 2.9 03/14/2025    AGRATIO 1.6 05/15/2024    BCR 11 03/14/2025    EGFR 93 03/14/2025     No results found for: \"CHOL\"  Lab Results   Component Value Date    TRIG 142 03/14/2025    TRIG 124 10/07/2024    TRIG 119 05/15/2024     Lab Results   Component Value Date    HDL 30 (L) 03/14/2025    HDL 29 (L) 10/07/2024    HDL 29 (L) 05/15/2024     Lab Results   Component Value Date    LDL 82 03/14/2025    LDL 78 10/07/2024    LDL 73 05/15/2024     Lab Results   Component Value Date    VLDL 25 03/14/2025    VLDL 23 10/07/2024    VLDL 22 05/15/2024     Lab Results   Component Value Date    LDLHDL 2.7 03/14/2025    LDLHDL 2.7 10/07/2024    LDLHDL 2.5 05/15/2024     CBC          5/15/2024    16:06 10/7/2024    15:31 3/14/2025    15:10   CBC   WBC 8.9  8.5  8.2    RBC 4.95  5.32  5.50    Hemoglobin 15.0  16.0  16.5    Hematocrit 44.6  48.8  49.7    MCV 90  92  90    MCH 30.3  30.1  30.0    MCHC 33.6  32.8  33.2    RDW 13.0  12.8  13.1    Platelets 241  263  264      No radiology results for the last 90 days.          Assessment:          Diagnosis Plan   1. Abnormally low high density lipoprotein (HDL) cholesterol with hypertriglyceridemia  CT Cardiac Calcium Score Without Dye      2. Family history of premature CAD  CT Cardiac Calcium Score Without Dye      3. Mixed hyperlipidemia  CT Cardiac Calcium Score Without Dye             Plan:       1.  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  Mixed hyperlipidemia on lipid-lowering therapy with good control, continue atorvastatin, AST, ALT reviewed  3.  Morbidly obese, will work on a weight loss, so far is lost over 80 pounds, is doing well, continue on this  4.  Prior concern about possible sleep apnea-previously seen by sleep medicine, they ordered a home sleep study but he could not complete it, " said he is doing much better now with his weight loss.  5.  Currently no exercise habits at all, I have discussed in detail recommendations for activity or exercise to lower future cardiovascular risk.    Thank you very much for allowing us to participate in the care of this pleasant patient.  Please don't hesitate to call if I can be of assistance in any way.      Current Outpatient Medications:     atorvastatin (Lipitor) 20 MG tablet, Take 1 tablet by mouth Every Night., Disp: 90 tablet, Rfl: 0    Cholecalciferol (Vitamin D3) 250 MCG (77591 UT) tablet, Take  by mouth., Disp: , Rfl:     omega-3 acid ethyl esters (LOVAZA) 1 g capsule, Take 2 capsules by mouth twice daily, Disp: 360 capsule, Rfl: 0    Tirzepatide-Weight Management (Zepbound) 15 MG/0.5ML solution auto-injector, Inject 0.5 mL under the skin into the appropriate area as directed 1 (One) Time Per Week., Disp: 6 mL, Rfl: 1    vitamin B-12 (CYANOCOBALAMIN) 1000 MCG tablet, Take 1 tablet by mouth Daily., Disp: , Rfl:          No follow-ups on file.

## 2025-04-16 ENCOUNTER — HOSPITAL ENCOUNTER (OUTPATIENT)
Dept: CT IMAGING | Facility: HOSPITAL | Age: 35
Discharge: HOME | End: 2025-04-16
Admitting: INTERNAL MEDICINE

## 2025-04-16 DIAGNOSIS — E78.1 ABNORMALLY LOW HIGH DENSITY LIPOPROTEIN (HDL) CHOLESTEROL WITH HYPERTRIGLYCERIDEMIA: ICD-10-CM

## 2025-04-16 DIAGNOSIS — E78.6 ABNORMALLY LOW HIGH DENSITY LIPOPROTEIN (HDL) CHOLESTEROL WITH HYPERTRIGLYCERIDEMIA: ICD-10-CM

## 2025-04-16 DIAGNOSIS — E78.2 MIXED HYPERLIPIDEMIA: ICD-10-CM

## 2025-04-16 DIAGNOSIS — Z82.49 FAMILY HISTORY OF PREMATURE CAD: ICD-10-CM

## 2025-04-16 PROCEDURE — 75571 CT HRT W/O DYE W/CA TEST: CPT

## 2025-04-28 ENCOUNTER — PATIENT MESSAGE (OUTPATIENT)
Dept: FAMILY MEDICINE CLINIC | Facility: CLINIC | Age: 35
End: 2025-04-28
Payer: COMMERCIAL

## 2025-04-28 DIAGNOSIS — E66.813 CLASS 3 SEVERE OBESITY WITH SERIOUS COMORBIDITY AND BODY MASS INDEX (BMI) OF 40.0 TO 44.9 IN ADULT, UNSPECIFIED OBESITY TYPE: ICD-10-CM

## 2025-04-28 DIAGNOSIS — E66.01 CLASS 3 SEVERE OBESITY WITH SERIOUS COMORBIDITY AND BODY MASS INDEX (BMI) OF 40.0 TO 44.9 IN ADULT, UNSPECIFIED OBESITY TYPE: ICD-10-CM

## 2025-04-28 DIAGNOSIS — R06.83 SNORING: Primary | ICD-10-CM

## 2025-04-28 DIAGNOSIS — R79.89 LOW TESTOSTERONE IN MALE: ICD-10-CM

## 2025-04-28 DIAGNOSIS — R53.83 OTHER FATIGUE: ICD-10-CM

## 2025-04-28 DIAGNOSIS — E78.2 MIXED HYPERLIPIDEMIA: ICD-10-CM

## 2025-04-29 RX ORDER — ATORVASTATIN CALCIUM 20 MG/1
20 TABLET, FILM COATED ORAL NIGHTLY
Qty: 90 TABLET | Refills: 1 | Status: SHIPPED | OUTPATIENT
Start: 2025-04-29

## 2025-04-29 NOTE — TELEPHONE ENCOUNTER
Please see result note.  Please schedule patient for follow-up with me the middle of September for his 6-month follow-up.

## 2025-05-19 ENCOUNTER — OFFICE VISIT (OUTPATIENT)
Dept: SLEEP MEDICINE | Facility: HOSPITAL | Age: 35
End: 2025-05-19
Payer: COMMERCIAL

## 2025-05-19 VITALS
DIASTOLIC BLOOD PRESSURE: 67 MMHG | HEART RATE: 80 BPM | HEIGHT: 70 IN | WEIGHT: 245 LBS | BODY MASS INDEX: 35.07 KG/M2 | OXYGEN SATURATION: 96 % | SYSTOLIC BLOOD PRESSURE: 108 MMHG

## 2025-05-19 DIAGNOSIS — G47.10 HYPERSOMNIA: Primary | ICD-10-CM

## 2025-05-19 DIAGNOSIS — R06.83 SNORING: ICD-10-CM

## 2025-05-19 DIAGNOSIS — F41.9 ANXIETY: ICD-10-CM

## 2025-05-19 DIAGNOSIS — E66.01 CLASS 3 SEVERE OBESITY WITH SERIOUS COMORBIDITY AND BODY MASS INDEX (BMI) OF 40.0 TO 44.9 IN ADULT, UNSPECIFIED OBESITY TYPE: ICD-10-CM

## 2025-05-19 DIAGNOSIS — E66.813 CLASS 3 SEVERE OBESITY WITH SERIOUS COMORBIDITY AND BODY MASS INDEX (BMI) OF 40.0 TO 44.9 IN ADULT, UNSPECIFIED OBESITY TYPE: ICD-10-CM

## 2025-05-19 DIAGNOSIS — F51.04 PSYCHOPHYSIOLOGICAL INSOMNIA: ICD-10-CM

## 2025-05-19 DIAGNOSIS — R79.89 LOW TESTOSTERONE IN MALE: ICD-10-CM

## 2025-05-19 PROCEDURE — G0463 HOSPITAL OUTPT CLINIC VISIT: HCPCS

## 2025-05-19 RX ORDER — TRAZODONE HYDROCHLORIDE 50 MG/1
50 TABLET ORAL NIGHTLY
Qty: 30 TABLET | Refills: 1 | Status: SHIPPED | OUTPATIENT
Start: 2025-05-19

## 2025-05-19 NOTE — PROGRESS NOTES
[facility]  Telephone: [phone] / Fax: [phone] [place]  Telephone: [phone] / Fax: [phone] Nalini Sorenson    PCP: Sis Simental PA    Reason for visit: HAYLEE f/u    Hank Mayorga is a 34 y.o.male  was last seen at [facility] sleep lab in  November 2023. I ordered HST. He could not do due to severe anxiety. He is unable to sleep if he has anything touching his face. He returns in view of persistent fatigue and EDS. He sleeps 5 hours per night. He has recurrent arousals due to apneas. Snoring is still present but a bit better after losing 70 lbs with Zepbound. It was prescribed originally by PCP as he was prediabetic. As he lost wt, A1C improved to 5.3. He switched jobs. New insurance stopped covering the Zepbound as his A1C is now within normal range.      Since he stopped Zepbound, he gained 10 lb over the past 6 weeks. Snoring started to increase. He is here today to be re-evaluated and arrange testing. He would like to treat the sleep apnea if we confirm he has it.  Though he hesitates due to anxiety.     If sleep study shows moderate-severe HAYLEE, he would like to re-qualify for Zepbound based on HAYLEE indication.  His anxiety is intermittent, and he is concerned that it may interfere with doing a 3 channel HST. He would like to try Watch PAT study instead if possible.    Zepbound prescription history reviewed. He started  at 2.5mg, went up all the way to 15mg. He was on max dose for 6 months. He had no side effects other than minor stomach aches at the beginning of therapy. Max  weight loss was observed when he was between 10mg-12.5mg. He was doing the injections himself.    He is concerned about generalized sleep anxiety and requests to try a short term medication.    SH- 1 alc per week, 1 caffeine per day.    ROS- +SOA, +anxiety, rest is negative.    Current Medications:    Current Outpatient Medications:     atorvastatin (Lipitor) 20 MG tablet, Take 1 tablet by mouth Every  "Night., Disp: 90 tablet, Rfl: 1    Cholecalciferol (Vitamin D3) 250 MCG (14414 UT) tablet, Take  by mouth., Disp: , Rfl:     omega-3 acid ethyl esters (LOVAZA) 1 g capsule, Take 2 capsules by mouth twice daily, Disp: 360 capsule, Rfl: 0    Tirzepatide-Weight Management (Zepbound) 15 MG/0.5ML solution auto-injector, Inject 0.5 mL under the skin into the appropriate area as directed 1 (One) Time Per Week., Disp: 6 mL, Rfl: 1    vitamin B-12 (CYANOCOBALAMIN) 1000 MCG tablet, Take 1 tablet by mouth Daily., Disp: , Rfl:    also entered in Sleep Questionnaire    Patient  has a past medical history of Knee swelling (3 yrs ago) and Tendinosis of right rotator cuff (7/25/2024).    I have reviewed the Past Medical History, Past Surgical History, Social History and Family History.    Vital Signs /67   Pulse 80   Ht 177.8 cm (70\")   Wt 111 kg (245 lb)   SpO2 96%   BMI 35.15 kg/m²  Body mass index is 35.15 kg/m².    General Alert and oriented. No acute distress noted   Pharynx/Throat Class   Mallampati airway, large tongue, no evidence of redundant lateral pharyngeal tissue. No oral lesions. No thrush. Moist mucous membranes.   Head Normocephalic. Symmetrical. Atraumatic.    Nose No septal deviation. No drainage   Chest Wall Normal shape. Symmetric expansion with respiration. No tenderness.   Neck Trachea midline, no thyromegaly or adenopathy    Lungs Clear to auscultation bilaterally. No wheezes. No rhonchi. No rales. Respirations regular, even and unlabored.   Heart Regular rhythm and normal rate. Normal S1 and S2. No murmur   Abdomen Soft, non-tender and non-distended. Normal bowel sounds. No masses.   Extremities Moves all extremities well. No edema   Psychiatric Normal mood and affect.     Testing:  N/a    Impression:  1. Hypersomnia    2. Snoring    3. Class 3 severe obesity with serious comorbidity and body mass index (BMI) of 40.0 to 44.9 in adult, unspecified obesity type    4. Low testosterone in male    5. " Psychophysiological insomnia    6. Anxiety          Plan:  Watch PAT study was ordered due to patient's prior history of anxiety with flow sensor and HST belts. Per discussion with staff, it is not available for use at this time. We therefore ordered standard 3 channel HSAT.     Try short term Trazodone 50 mg qhs prn.    RTC after testing    Thank you for allowing me to participate in your patient's care.      KIERRA Leroy  [facility]  Phone: [phone]      Part of this note may be an electronic transcription/translation of spoken language to printed text using the Dragon Dictation System.

## 2025-07-28 RX ORDER — TRAZODONE HYDROCHLORIDE 50 MG/1
50 TABLET ORAL NIGHTLY
Qty: 30 TABLET | Refills: 1 | Status: SHIPPED | OUTPATIENT
Start: 2025-07-28